# Patient Record
Sex: FEMALE | Race: WHITE | Employment: UNEMPLOYED | URBAN - METROPOLITAN AREA
[De-identification: names, ages, dates, MRNs, and addresses within clinical notes are randomized per-mention and may not be internally consistent; named-entity substitution may affect disease eponyms.]

---

## 2017-02-24 ENCOUNTER — DOCUMENTATION ONLY (OUTPATIENT)
Dept: PAIN MANAGEMENT | Age: 42
End: 2017-02-24

## 2017-02-24 ENCOUNTER — OFFICE VISIT (OUTPATIENT)
Dept: PAIN MANAGEMENT | Age: 42
End: 2017-02-24

## 2017-02-24 VITALS
WEIGHT: 175 LBS | HEIGHT: 67 IN | BODY MASS INDEX: 27.47 KG/M2 | HEART RATE: 70 BPM | SYSTOLIC BLOOD PRESSURE: 160 MMHG | DIASTOLIC BLOOD PRESSURE: 99 MMHG

## 2017-02-24 DIAGNOSIS — K66.0 ADHESION OF ABDOMINAL WALL: ICD-10-CM

## 2017-02-24 DIAGNOSIS — M54.12 BRACHIAL NEURITIS OR RADICULITIS: ICD-10-CM

## 2017-02-24 DIAGNOSIS — M53.1 OTHER SYNDROMES AFFECTING CERVICAL REGION: ICD-10-CM

## 2017-02-24 DIAGNOSIS — M47.812 FACET ARTHROPATHY, CERVICAL: ICD-10-CM

## 2017-02-24 DIAGNOSIS — G62.9 NEUROPATHY: ICD-10-CM

## 2017-02-24 DIAGNOSIS — G25.81 RESTLESS LEGS SYNDROME (RLS): ICD-10-CM

## 2017-02-24 DIAGNOSIS — M25.572 CHRONIC PAIN OF LEFT ANKLE: Primary | ICD-10-CM

## 2017-02-24 DIAGNOSIS — M54.17 LUMBOSACRAL RADICULOPATHY: ICD-10-CM

## 2017-02-24 DIAGNOSIS — G89.29 CHRONIC PAIN OF LEFT ANKLE: Primary | ICD-10-CM

## 2017-02-24 DIAGNOSIS — G89.29 CHRONIC BILATERAL LOW BACK PAIN WITH BILATERAL SCIATICA: ICD-10-CM

## 2017-02-24 DIAGNOSIS — M79.18 MUSCULOSKELETAL PAIN: ICD-10-CM

## 2017-02-24 DIAGNOSIS — M54.41 CHRONIC BILATERAL LOW BACK PAIN WITH BILATERAL SCIATICA: ICD-10-CM

## 2017-02-24 DIAGNOSIS — M79.7 FIBROMYALGIA: ICD-10-CM

## 2017-02-24 DIAGNOSIS — Z79.899 ENCOUNTER FOR LONG-TERM (CURRENT) USE OF HIGH-RISK MEDICATION: ICD-10-CM

## 2017-02-24 DIAGNOSIS — G43.119 INTRACTABLE MIGRAINE WITH AURA WITHOUT STATUS MIGRAINOSUS: ICD-10-CM

## 2017-02-24 DIAGNOSIS — M54.42 CHRONIC BILATERAL LOW BACK PAIN WITH BILATERAL SCIATICA: ICD-10-CM

## 2017-02-24 DIAGNOSIS — G89.4 CHRONIC PAIN SYNDROME: ICD-10-CM

## 2017-02-24 DIAGNOSIS — M50.30 DDD (DEGENERATIVE DISC DISEASE), CERVICAL: ICD-10-CM

## 2017-02-24 RX ORDER — ONDANSETRON 8 MG/1
TABLET, ORALLY DISINTEGRATING ORAL
Qty: 60 TAB | Refills: 5 | Status: SHIPPED | OUTPATIENT
Start: 2017-02-24 | End: 2018-02-13 | Stop reason: SDUPTHER

## 2017-02-24 RX ORDER — OXYCODONE AND ACETAMINOPHEN 10; 325 MG/1; MG/1
1 TABLET ORAL
Qty: 180 TAB | Refills: 0 | Status: SHIPPED | OUTPATIENT
Start: 2017-03-25 | End: 2017-04-24

## 2017-02-24 RX ORDER — TRAZODONE HYDROCHLORIDE 150 MG/1
75-150 TABLET ORAL
Qty: 30 TAB | Refills: 11 | Status: SHIPPED | OUTPATIENT
Start: 2017-02-24 | End: 2017-03-26

## 2017-02-24 RX ORDER — OXYCODONE AND ACETAMINOPHEN 10; 325 MG/1; MG/1
1 TABLET ORAL
Qty: 180 TAB | Refills: 0 | Status: SHIPPED | OUTPATIENT
Start: 2017-02-24 | End: 2017-03-26

## 2017-02-24 RX ORDER — OXYCODONE AND ACETAMINOPHEN 10; 325 MG/1; MG/1
1 TABLET ORAL
Qty: 180 TAB | Refills: 0 | Status: SHIPPED | OUTPATIENT
Start: 2017-04-23 | End: 2017-05-22 | Stop reason: SDUPTHER

## 2017-02-24 NOTE — PROGRESS NOTES
Paperwork for back brace faxed to [facility]. They will schedule directly with pt.

## 2017-02-24 NOTE — PROGRESS NOTES
HISTORY OF PRESENT ILLNESS  Jameel Issa is a 36 y.o. female. HPI she returns for follow-up of chronic, severe pain which is multifocal and multifactorial and includes chronic intractable migraine headaches, low back pain with underlying degenerative disc disease and radiculopathy, cervicogenic headaches and cervical radiculopathy, restless leg syndrome, and fibromyalgia. She has also been plagued by increasingly severe left ankle and foot pain with instability of the ankle to the point where she has experienced multiple episodes where her ankle rolls out from under her. She is also experiencing significant numbness in the left ankle. There is no history of trauma and orthopedic consultation will be arranged. Since last seen, she was seen by her PCP as result of increasingly severe paracervical muscle spasm and received an injection of Toradol. Her most recent MRI of the cervical spine was reviewed with results as follows: FINDINGS: CEREBELLAR TONSILS NORMAL. NO DEMYELINATING PROCESS. NO  PREVERTEBRAL SOFT TISSUE SWELLING. AT C2-3 MILD DISC BULGE. NO STENOSIS. AT C3-4 SHALLOW POSTERIOR DISC BULGE CAUSING MILD RETRO THECAL SAC  INDENTATION. NO STENOSIS. AT C4-5 NORMAL DISC. NO STENOSIS. AT C5-C6 DISC BULGE WITH RIGHT FORAMINAL DISC OSTEOPHYTE COMPLEX 3 MM AP  CAUSING MILD TO MODERATE RIGHT NEURAL FORAMINAL STENOSIS. DISC BULGES  WITH MILD VENTRAL THECAL SAC INDENTATION. MILD CENTRAL AND MILD LEFT  NEURAL FORAMINAL STENOSIS. AT C5-6 THROUGH T1-T2 NORMAL DISC. NO STENOSIS. The significance of the MRI findings was discussed with patient. She has also undergone an MRI of the brain which was reviewed with results as follows:       FINDINGS:     The left vertebral artery is dominant. There is no focal hemodynamically  significant stenosis within the anterior or posterior circulation. The anterior  and posterior communicating arteries appear patent.  There is a 3.2 mm aneurysm  projecting medially from the supraclinoid portion of the left internal carotid  artery visualized best on image 1 of series 301 and image 71 of series 3. Additional 1 mm aneurysm versus vessel tortuosity at the junction of the left A1  and M1 segments. She is slated to be seen in neuroradiologic consultation and undergo an ablative procedure of the aneurysm. Pain level today 6 out of 10, outcome 13/28,(The lower the upper number, the better the outcome) nothing dramatic  EFPhysical activity and mobility as well as mood are fair, sleep is poor. No reported side effects. Review of Systems   Constitutional: Positive for malaise/fatigue. Negative for chills, fever and weight loss. HENT: Positive for congestion and sore throat. Negative for hearing loss. Eyes: Negative for blurred vision and double vision. Respiratory: Positive for cough and shortness of breath. Smoker (1.5 ppd)   Cardiovascular: Negative for chest pain and leg swelling. Gastrointestinal: Positive for heartburn. Negative for constipation, diarrhea, nausea and vomiting. Genitourinary: Positive for frequency. Musculoskeletal: Positive for back pain, joint pain, myalgias and neck pain. Negative for falls. Skin: Negative. Neurological: Positive for tingling, sensory change and headaches. Negative for dizziness, tremors, seizures and weakness. Endo/Heme/Allergies: Positive for environmental allergies. Does not bruise/bleed easily. Psychiatric/Behavioral: Negative for depression and suicidal ideas. The patient is nervous/anxious and has insomnia. All other systems reviewed and are negative. Physical Exam   Constitutional: She is oriented to person, place, and time. She appears well-developed and well-nourished. No distress. HENT:   Head: Normocephalic. Eyes: Conjunctivae and EOM are normal. Pupils are equal, round, and reactive to light. Neck: Normal range of motion. No thyromegaly present.    Painful ROM Pulmonary/Chest: Effort normal. No respiratory distress. Musculoskeletal: She exhibits tenderness. She exhibits no edema. Left wrist: She exhibits tenderness. Cervical back: She exhibits decreased range of motion, tenderness, pain and spasm. Lumbar back: She exhibits decreased range of motion, tenderness, pain and spasm. Neurological: She is alert and oriented to person, place, and time. No cranial nerve deficit (grossly). Gait (antalgic) abnormal.   CN 2-12 grossly intact   Psychiatric: She has a normal mood and affect. Her behavior is normal. Judgment and thought content normal.   Nursing note and vitals reviewed. ASSESSMENT and PLAN  Encounter Diagnoses   Name Primary?  Chronic pain of left ankle Yes    Intractable migraine with aura without status migrainosus     Brachial neuritis or radiculitis     Neuropathy     Lumbosacral radiculopathy     Fibromyalgia     Musculoskeletal pain     Restless legs syndrome (RLS)     Adhesion of abdominal wall     DDD (degenerative disc disease), cervical     Facet arthropathy, cervical (HCC)     Chronic pain syndrome     Other syndromes affecting cervical region     Chronic bilateral low back pain with bilateral sciatica     Encounter for long-term (current) use of high-risk medication      Orthopedic consultation will be arranged with respect to her recurrent left ankle derangement. A DDS lumbar traction belt will be obtained. Allergies had been ordered in the past but was never provided to the patient. She will continue on her current analgesic regimen as this is providing adequate pain control with improve functionality and minimal side effects. 3 month reassess her    No concerns are raised for misuse, abuse, or diversion. 1. Pain medications are prescribed with the objective of pain relief and improved physical and psychosocial function in this patient.   2. Counseled patient on proper use of prescribed medications and reviewed opioid contract. 3. Counseled patient about chronic medical conditions and their relationship to anxiety and depression and recommended mental health support as needed. 4. Reviewed with patient self-help tools, home exercise, and lifestyle changes to assist the patient in self-management of symptoms. 5. Advised patient to have a primary care provider to continue care for health maintenance and general medical conditions and support for referral to specialty care as needed. 6. Reviewed with patient the treatment plan, goals of treatment plan, and limitations of treatment plan, to include the potential for side effects from medications and procedures. If side effects occur, it is the responsibility of the patient to inform the clinic so that a change in the treatment plan can be made in a safe manner. The patient is advised that stopping prescribed medication may cause an increase in symptoms and possible medication withdrawal symptoms. The patient is informed an emergency room evaluation may be necessary if this occurs. DISPOSITION: The patients condition and plan were discussed at length and all questions were answered. The patient agrees with the plan.     Counseling occupied > 50% of visit:  Total time: 40 minutes

## 2017-02-24 NOTE — PROGRESS NOTES
Nursing Notes    Patient presents to the office today in follow-up. Patient rates her pain at 6/10 on the numerical pain scale. Reviewed medications with counts as follows:    Rx Date filled Qty Dispensed Pill Count Last Dose Short   Percocet 10/325 mg 01/25/17 180 5 today no                                  POC UDS was not performed in office today    Any new labs or imaging since last appointment? NO    Have you been to an emergency room (ER) or urgent care clinic since your last visit? NO            Have you been hospitalized since your last visit? NO     If yes, where, when, and reason for visit? Have you seen or consulted any other health care providers outside of the [address] [place] [address]  since your last visit? YES     If yes, where, when, and reason for visit? pcp    HM deferred to pcp.

## 2017-05-22 RX ORDER — OXYCODONE AND ACETAMINOPHEN 10; 325 MG/1; MG/1
1 TABLET ORAL
Qty: 180 TAB | Refills: 0 | Status: SHIPPED | OUTPATIENT
Start: 2017-05-22 | End: 2017-05-31 | Stop reason: SDUPTHER

## 2017-05-22 RX ORDER — GABAPENTIN 300 MG/1
300 CAPSULE ORAL 3 TIMES DAILY
Qty: 90 CAP | Refills: 5 | Status: SHIPPED | OUTPATIENT
Start: 2017-05-22 | End: 2017-05-31 | Stop reason: SDUPTHER

## 2017-05-22 NOTE — TELEPHONE ENCOUNTER
The pt was called and made aware that the provider will not be in the office for her appt. She was offered another appt and will need medications to make it to that appt. A  was obtained and there were no aberrancies noted. She last filled her percocet on 04/23/17. She will be due for a UDS specimen at her prescription .

## 2017-05-31 ENCOUNTER — OFFICE VISIT (OUTPATIENT)
Dept: PAIN MANAGEMENT | Age: 42
End: 2017-05-31

## 2017-05-31 VITALS — SYSTOLIC BLOOD PRESSURE: 155 MMHG | HEART RATE: 79 BPM | DIASTOLIC BLOOD PRESSURE: 93 MMHG

## 2017-05-31 DIAGNOSIS — M54.17 LUMBOSACRAL RADICULOPATHY: ICD-10-CM

## 2017-05-31 DIAGNOSIS — M79.18 MUSCULOSKELETAL PAIN: ICD-10-CM

## 2017-05-31 DIAGNOSIS — G89.4 CHRONIC PAIN SYNDROME: Primary | ICD-10-CM

## 2017-05-31 DIAGNOSIS — G24.3 CERVICAL DYSTONIA: ICD-10-CM

## 2017-05-31 DIAGNOSIS — M47.812 FACET ARTHROPATHY, CERVICAL: ICD-10-CM

## 2017-05-31 DIAGNOSIS — M79.7 FIBROMYALGIA: ICD-10-CM

## 2017-05-31 DIAGNOSIS — G43.119 INTRACTABLE MIGRAINE WITH AURA WITHOUT STATUS MIGRAINOSUS: ICD-10-CM

## 2017-05-31 DIAGNOSIS — Z79.899 ENCOUNTER FOR LONG-TERM (CURRENT) USE OF MEDICATIONS: ICD-10-CM

## 2017-05-31 DIAGNOSIS — M50.30 DEGENERATION OF CERVICAL INTERVERTEBRAL DISC: ICD-10-CM

## 2017-05-31 DIAGNOSIS — M25.78 CERVICAL OSTEOPHYTE: ICD-10-CM

## 2017-05-31 LAB
ALCOHOL UR POC: NORMAL
AMPHETAMINES UR POC: NEGATIVE
BARBITURATES UR POC: NEGATIVE
BENZODIAZEPINES UR POC: NEGATIVE
BUPRENORPHINE UR POC: NORMAL
CANNABINOIDS UR POC: NEGATIVE
CARISOPRODOL UR POC: NORMAL
COCAINE UR POC: NEGATIVE
FENTANYL UR POC: NORMAL
MDMA/ECSTASY UR POC: NEGATIVE
METHADONE UR POC: NEGATIVE
METHAMPHETAMINE UR POC: NEGATIVE
METHYLPHENIDATE UR POC: NEGATIVE
OPIATES UR POC: NEGATIVE
OXYCODONE UR POC: NORMAL
PHENCYCLIDINE UR POC: NEGATIVE
PROPOXYPHENE UR POC: NORMAL
TRAMADOL UR POC: NORMAL
TRICYCLICS UR POC: NEGATIVE

## 2017-05-31 RX ORDER — GABAPENTIN 300 MG/1
300 CAPSULE ORAL 3 TIMES DAILY
Qty: 90 CAP | Refills: 3 | Status: SHIPPED | OUTPATIENT
Start: 2017-05-31 | End: 2018-02-13 | Stop reason: SDUPTHER

## 2017-05-31 RX ORDER — OXYCODONE AND ACETAMINOPHEN 10; 325 MG/1; MG/1
1 TABLET ORAL
Qty: 180 TAB | Refills: 0 | Status: SHIPPED | OUTPATIENT
Start: 2017-06-29 | End: 2017-05-31 | Stop reason: SDUPTHER

## 2017-05-31 RX ORDER — OXYCODONE AND ACETAMINOPHEN 10; 325 MG/1; MG/1
1 TABLET ORAL
Qty: 180 TAB | Refills: 0 | Status: SHIPPED | OUTPATIENT
Start: 2017-07-28 | End: 2017-09-11 | Stop reason: SDUPTHER

## 2017-05-31 RX ORDER — OXYCODONE AND ACETAMINOPHEN 10; 325 MG/1; MG/1
1 TABLET ORAL
Qty: 180 TAB | Refills: 0 | Status: SHIPPED | OUTPATIENT
Start: 2017-05-31 | End: 2017-05-31

## 2017-05-31 NOTE — MR AVS SNAPSHOT
Visit Information Date & Time Provider Department Dept. Phone Encounter #  
 5/31/2017  3:15 PM Caleb Nance MD [facility] [address] for Pain Management [phone] Follow-up Instructions Return in about 3 months (around 8/31/2017). Upcoming Health Maintenance Date Due Pneumococcal 19-64 Medium Risk (1 of 1 - PPSV23) 1/15/1993 DTaP/Tdap/Td series (1 - Tdap) 1/15/1995 PAP AKA CERVICAL CYTOLOGY 1/15/1995 INFLUENZA AGE 9 TO ADULT 8/1/2017 Allergies as of 5/31/2017  Review Complete On: 5/31/2017 By: Yolande Peck LPN Severity Noted Reaction Type Reactions Bactrim [Sulfamethoprim Ds]    Unknown (comments) Cefuroxime    Unknown (comments) Codeine    Unknown (comments) Compazine [Prochlorperazine Edisylate]    Unknown (comments) Glycopyrrolate    Unknown (comments) Keflex [Cephalexin]    Unknown (comments) Levaquin [Levofloxacin]    Unknown (comments) Morphine    Unknown (comments) Phenergan [Promethazine]    Unknown (comments) Reglan [Metoclopramide]    Unknown (comments) Vicodin [Hydrocodone-acetaminophen]    Unknown (comments) Current Immunizations  Never Reviewed No immunizations on file. Not reviewed this visit You Were Diagnosed With   
  
 Codes Comments Encounter for long-term (current) use of medications    -  Primary ICD-10-CM: U01.354 ICD-9-CM: V58.69 Vitals BP Pulse LMP OB Status Smoking Status (!) 155/93 79 05/14/2017 Having regular periods Current Every Day Smoker Vitals History Preferred Pharmacy Pharmacy Name Phone [address], [address] [place] [address] [phone] Your Updated Medication List  
  
   
This list is accurate as of: 5/31/17  3:52 PM.  Always use your most recent med list.  
  
  
  
  
 ATENOLOL PO Take  by mouth. frovatriptan 2.5 mg tablet Commonly known as:  Hafsa Hicks Take 1 Tab by mouth once as needed for Migraine for up to 1 dose. gabapentin 300 mg capsule Commonly known as:  NEURONTIN Take 1 Cap by mouth three (3) times daily for 30 days. JANUVIA 100 mg tablet Generic drug:  SITagliptin Take 50 mg by mouth daily. METFORMIN PO Take  by mouth.  
  
 methylPREDNISolone 4 mg tablet Commonly known as:  Thi Pacheco Per dose pack instructions MOTRIN 800 mg tablet Generic drug:  ibuprofen Take  by mouth.  
  
 naratriptan 2.5 mg Tab Commonly known as:  Ella Kays Take 1 Tab by mouth once as needed for Migraine for up to 1 dose. 2.5 mg at onset of headache, may repeat in 4 hours if needed  
  
 ondansetron 8 mg disintegrating tablet Commonly known as:  ZOFRAN ODT  
DISSOLVE 1 TABLET UNDER THE TONGUE EVERY EIGHT HOURS AS NEEDED FOR NAUSEA  
  
 * OTHER  
T-L-S-O  
  
 * OTHER  
DDS Lumbar Traction Belt * PERCOCET  mg per tablet Generic drug:  oxyCODONE-acetaminophen Take 1 Tab by mouth. * oxyCODONE-acetaminophen  mg per tablet Commonly known as:  PERCOCET 10 Take 1 Tab by mouth every four (4) hours as needed for Pain (chronic) for up to 30 days. Max Daily Amount: 6 Tabs. Due to fill 05/22/17  Indications: Pain  
  
 propranolol LA 60 mg SR capsule Commonly known as:  INDERAL LA Take  by mouth daily. PROTONIX 40 mg tablet Generic drug:  pantoprazole Take 40 mg by mouth daily. REQUIP PO Take  by mouth. TENS Units Kennis Pesa Commonly known as:  TENS 502  
1 Units by Does Not Apply route as directed. topiramate 25 mg tablet Commonly known as:  TOPAMAX This is a titration schedule. Start with 1 po qhs x 7 days. Then 1 po bid x 7 days. Then  1 po qam and 2 po qhs. Then  2 po bid. ZOFRAN (AS HYDROCHLORIDE) 8 mg tablet Generic drug:  ondansetron hcl Take 8 mg by mouth every eight (8) hours as needed for Nausea. ZyrTEC 10 mg tablet Generic drug:  cetirizine Take  by mouth daily. * Notice: This list has 4 medication(s) that are the same as other medications prescribed for you. Read the directions carefully, and ask your doctor or other care provider to review them with you. We Performed the Following AMB POC DRUG SCREEN () [ [facility]] DRUG SCREEN [[id number] Custom] Follow-up Instructions Return in about 3 months (around 8/31/2017). Introducing hospitals & [facility] SERVICES! [facility] introduces [employer] patient portal. Now you can access parts of your medical record, email your doctor's office, and request medication refills online. 1. In your internet browser, go to [email or web address] 2. Click on the First Time User? Click Here link in the Sign In box. You will see the New Member Sign Up page. 3. Enter your [employer] Access Code exactly as it appears below. You will not need to use this code after youve completed the sign-up process. If you do not sign up before the expiration date, you must request a new code. · [employer] Access Code: [email or web address] Expires: 8/29/2017  3:52 PM 
 
4. Enter the last four digits of your Social Security Number (xxxx) and Date of Birth (mm/dd/yyyy) as indicated and click Submit. You will be taken to the next sign-up page. 5. Create a [employer] ID. This will be your [employer] login ID and cannot be changed, so think of one that is secure and easy to remember. 6. Create a [employer] password. You can change your password at any time. 7. Enter your Password Reset Question and Answer. This can be used at a later time if you forget your password. 8. Enter your e-mail address. You will receive e-mail notification when new information is available in [address] 9. Click Sign Up. You can now view and download portions of your medical record. 10. Click the Download Summary menu link to download a portable copy of your medical information. If you have questions, please visit the Frequently Asked Questions section of the [employer] website. Remember, [employer] is NOT to be used for urgent needs. For medical emergencies, dial 911. Now available from your iPhone and Android! Please provide this summary of care documentation to your next provider. Your primary care clinician is listed as Patricia Briones. If you have any questions after today's visit, please call [phone].

## 2017-05-31 NOTE — PROGRESS NOTES
Nursing Notes    Patient presents to the office today in follow-up. Reviewed medications with counts as follows:    Rx Date filled Qty Dispensed Pill Count Last Dose Short   Percocet 10/325 5/23/17 180 128 today no   Ms. Stephenie Montgomery has a reminder for a \"due or due soon\" health maintenance. I have asked that she contact her primary care provider for follow-up on this health maintenance. Comments: patient came in sick today. Complaints of vomiting. Refuses to wear a face mask. Denies visiting her PCP    POC UDS was performed in office today    Any new labs or imaging since last appointment? NO    Have you been to an emergency room (ER) or urgent care clinic since your last visit? NO            Have you been hospitalized since your last visit? NO     If yes, where, when, and reason for visit? Have you seen or consulted any other health care providers outside of the [address] [place] [address]  since your last visit? NO     If yes, where, when, and reason for visit?

## 2017-08-09 ENCOUNTER — TELEPHONE (OUTPATIENT)
Dept: PAIN MANAGEMENT | Age: 42
End: 2017-08-09

## 2017-08-09 NOTE — TELEPHONE ENCOUNTER
Called insurance re pa for oxycodone/apap - spoke with Marianna Almodovar. Pa is in place - need to put in updates. Marianna Almodovar put me on hold to update system. Script will now go through - Marianna Almodovar ask me to call [employer] while she is on the phone to make sure it will go through. Spoke with pharmacist at [facility] - said pt paid out of pocket for the medication on 7/18. Bishop Messing pt pa for medication - said if pt wants to be reimbursed she has to call - no guarantee she will get her money back. Called pt to let her know problem is fixed.

## 2017-09-11 ENCOUNTER — OFFICE VISIT (OUTPATIENT)
Dept: PAIN MANAGEMENT | Age: 42
End: 2017-09-11

## 2017-09-11 VITALS
RESPIRATION RATE: 12 BRPM | DIASTOLIC BLOOD PRESSURE: 99 MMHG | TEMPERATURE: 98.1 F | WEIGHT: 179 LBS | SYSTOLIC BLOOD PRESSURE: 187 MMHG | HEIGHT: 67 IN | BODY MASS INDEX: 28.09 KG/M2 | HEART RATE: 76 BPM

## 2017-09-11 DIAGNOSIS — M79.7 FIBROMYALGIA: ICD-10-CM

## 2017-09-11 DIAGNOSIS — G89.29 CHRONIC BILATERAL LOW BACK PAIN WITH BILATERAL SCIATICA: ICD-10-CM

## 2017-09-11 DIAGNOSIS — M53.1 OTHER SYNDROMES AFFECTING CERVICAL REGION: ICD-10-CM

## 2017-09-11 DIAGNOSIS — Z86.79 HX OF ANEURYSM: ICD-10-CM

## 2017-09-11 DIAGNOSIS — M54.41 CHRONIC BILATERAL LOW BACK PAIN WITH BILATERAL SCIATICA: ICD-10-CM

## 2017-09-11 DIAGNOSIS — G25.81 RESTLESS LEGS SYNDROME (RLS): ICD-10-CM

## 2017-09-11 DIAGNOSIS — M54.42 CHRONIC BILATERAL LOW BACK PAIN WITH BILATERAL SCIATICA: ICD-10-CM

## 2017-09-11 DIAGNOSIS — G43.119 INTRACTABLE MIGRAINE WITH AURA WITHOUT STATUS MIGRAINOSUS: Primary | ICD-10-CM

## 2017-09-11 DIAGNOSIS — G62.9 NEUROPATHY: ICD-10-CM

## 2017-09-11 DIAGNOSIS — M54.17 LUMBOSACRAL RADICULOPATHY: ICD-10-CM

## 2017-09-11 DIAGNOSIS — Z79.899 ENCOUNTER FOR LONG-TERM (CURRENT) USE OF HIGH-RISK MEDICATION: ICD-10-CM

## 2017-09-11 DIAGNOSIS — G89.4 CHRONIC PAIN SYNDROME: ICD-10-CM

## 2017-09-11 DIAGNOSIS — M54.12 BRACHIAL NEURITIS OR RADICULITIS: ICD-10-CM

## 2017-09-11 DIAGNOSIS — M50.30 DEGENERATION OF CERVICAL INTERVERTEBRAL DISC: ICD-10-CM

## 2017-09-11 DIAGNOSIS — M25.50 PAIN IN JOINT, MULTIPLE SITES: ICD-10-CM

## 2017-09-11 RX ORDER — OXAPROZIN 600 MG/1
TABLET, FILM COATED ORAL
Qty: 60 TAB | Refills: 5 | Status: SHIPPED | OUTPATIENT
Start: 2017-09-11

## 2017-09-11 RX ORDER — AMLODIPINE BESYLATE 5 MG/1
5 TABLET ORAL DAILY
Qty: 30 TAB | Refills: 5 | Status: SHIPPED | OUTPATIENT
Start: 2017-09-11 | End: 2017-10-11

## 2017-09-11 RX ORDER — OXYCODONE AND ACETAMINOPHEN 10; 325 MG/1; MG/1
1 TABLET ORAL
Qty: 180 TAB | Refills: 0 | Status: SHIPPED | OUTPATIENT
Start: 2017-11-11 | End: 2017-11-17 | Stop reason: SDUPTHER

## 2017-09-11 RX ORDER — OXYCODONE AND ACETAMINOPHEN 10; 325 MG/1; MG/1
1 TABLET ORAL
Qty: 180 TAB | Refills: 0 | Status: SHIPPED | OUTPATIENT
Start: 2017-09-15 | End: 2017-11-17 | Stop reason: SDUPTHER

## 2017-09-11 RX ORDER — OXYCODONE AND ACETAMINOPHEN 10; 325 MG/1; MG/1
1 TABLET ORAL
Qty: 180 TAB | Refills: 0 | Status: SHIPPED | OUTPATIENT
Start: 2017-10-13 | End: 2017-11-17 | Stop reason: SDUPTHER

## 2017-09-11 NOTE — PROGRESS NOTES
HISTORY OF PRESENT ILLNESS  Bertha Emanuel is a 36 y.o. female. HPI she returns for follow-up of chronic, severe pain which is multifocal and multifactorial and includes chronic intractable migraine headaches, low back pain with underlying degenerative disc disease and radiculopathy, cervicogenic headaches and cervical radiculopathy, restless leg syndrome, and fibromyalgia. Since last seen, she underwent duplex evaluation of the lower extremity venous circulation which was noted to be unremarkable. She is also complaining of increasing severe headaches. She has been reevaluated for aneurysm surgery and this is pending resolution of her intercurrent dental problems. Blood pressure noted be elevated today and Norvasc will be added at 5 mg daily both to help control her blood pressure as well as to aid as a preventative in her chronic vascular headaches. She has also been noting increasing low back pain. She has undergone epidural injections in the past with relief but wishes to hold off interventions at present time. A trial of Daypro will be added to her Percocet in attempt to gain better pain control, particularly if her acute headaches. Pain level today 6 out of 10, outcome 13/28,(The lower the upper number, the better the outcome)  Physical activity and mobility as well as mood of fair, sleep is poor. No reported side effects. A current review of the  does not identify any inconsistency. Review of Systems   Constitutional: Positive for malaise/fatigue. Negative for chills, fever and weight loss. HENT: Positive for congestion and sore throat. Negative for hearing loss. Eyes: Negative for blurred vision and double vision. Respiratory: Positive for cough and shortness of breath. Smoker (1.5 ppd)   Cardiovascular: Negative for chest pain and leg swelling. Gastrointestinal: Positive for heartburn. Negative for constipation, diarrhea, nausea and vomiting.    Genitourinary: Positive for frequency. Musculoskeletal: Positive for back pain, joint pain, myalgias and neck pain. Negative for falls. Skin: Negative. Neurological: Positive for tingling, sensory change and headaches. Negative for dizziness, tremors, seizures and weakness. Endo/Heme/Allergies: Positive for environmental allergies. Does not bruise/bleed easily. Psychiatric/Behavioral: Negative for depression and suicidal ideas. The patient is nervous/anxious and has insomnia. All other systems reviewed and are negative. Physical Exam   Constitutional: She is oriented to person, place, and time. She appears well-developed and well-nourished. No distress. HENT:   Head: Normocephalic. Eyes: Conjunctivae and EOM are normal. Pupils are equal, round, and reactive to light. Neck: Normal range of motion. No thyromegaly present. Painful ROM   Pulmonary/Chest: Effort normal. No respiratory distress. Musculoskeletal: She exhibits tenderness. She exhibits no edema. Left wrist: She exhibits tenderness. Cervical back: She exhibits decreased range of motion, tenderness, pain and spasm. Lumbar back: She exhibits decreased range of motion, tenderness, pain and spasm. Neurological: She is alert and oriented to person, place, and time. No cranial nerve deficit (grossly). Gait (antalgic) abnormal.   CN 2-12 grossly intact   Psychiatric: She has a normal mood and affect. Her behavior is normal. Judgment and thought content normal.   Nursing note and vitals reviewed. ASSESSMENT and PLAN  Encounter Diagnoses   Name Primary?     Intractable migraine with aura without status migrainosus Yes    Hx of aneurysm     Lumbosacral radiculopathy     Neuropathy (HCC)     Brachial neuritis or radiculitis     Chronic bilateral low back pain with bilateral sciatica     Pain in joint, multiple sites     Degeneration of cervical intervertebral disc     Encounter for long-term (current) use of high-risk medication     Other syndromes affecting cervical region     Chronic pain syndrome     Restless legs syndrome (RLS)     Fibromyalgia      Treatment plan as noted above. She will continue on her current analgesic regimen as this is providing adequate pain control with improved functionality and minimal side effects. 3 month reassess her    No concerns are raised for misuse, abuse, or diversion. 1. Pain medications are prescribed with the objective of pain relief and improved physical and psychosocial function in this patient. 2. Counseled patient on proper use of prescribed medications and reviewed opioid contract. 3. Counseled patient about chronic medical conditions and their relationship to anxiety and depression and recommended mental health support as needed. 4. Reviewed with patient self-help tools, home exercise, and lifestyle changes to assist the patient in self-management of symptoms. 5. Advised patient to have a primary care provider to continue care for health maintenance and general medical conditions and support for referral to specialty care as needed. 6. Reviewed with patient the treatment plan, goals of treatment plan, and limitations of treatment plan, to include the potential for side effects from medications and procedures. If side effects occur, it is the responsibility of the patient to inform the clinic so that a change in the treatment plan can be made in a safe manner. The patient is advised that stopping prescribed medication may cause an increase in symptoms and possible medication withdrawal symptoms. The patient is informed an emergency room evaluation may be necessary if this occurs. DISPOSITION: The patients condition and plan were discussed at length and all questions were answered. The patient agrees with the plan.     Counseling occupied > 50% of visit:  Total time: 40minutes

## 2017-09-11 NOTE — PROGRESS NOTES
Nursing Notes    Patient presents to the office today in follow-up. Patient rates her pain at 6/10 on the numerical pain scale. Reviewed medications with counts as follows:    Rx Date filled Qty Dispensed Pill Count Last Dose Short   Percocet 10-325mg 08/17/17 180 28 today                                           Comments:     POC UDS was not performed in office today    Any new labs or imaging since last appointment? NO    Have you been to an emergency room (ER) or urgent care clinic since your last visit? YES, in [place] for flu            Have you been hospitalized since your last visit? NO     If yes, where, when, and reason for visit? Have you seen or consulted any other health care providers outside of the [address] [place] [address]  since your last visit? YES     If yes, where, when, and reason for visit? HM deferred to pcp.

## 2017-09-11 NOTE — MR AVS SNAPSHOT
Visit Information Date & Time Provider Department Dept. Phone Encounter #  
 9/11/2017 10:40 AM Aubrey Rodas MD [address] [facility] [phone] Follow-up Instructions Return in about 3 months (around 12/11/2017). Upcoming Health Maintenance Date Due Pneumococcal 19-64 Medium Risk (1 of 1 - PPSV23) 1/15/1993 DTaP/Tdap/Td series (1 - Tdap) 1/15/1995 PAP AKA CERVICAL CYTOLOGY 1/15/1995 INFLUENZA AGE 9 TO ADULT 8/1/2017 Allergies as of 9/11/2017  Review Complete On: 9/11/2017 By: Aubrey Rodas MD  
  
 Severity Noted Reaction Type Reactions Bactrim [Sulfamethoprim Ds]    Unknown (comments) Cefuroxime    Unknown (comments) Codeine    Unknown (comments) Compazine [Prochlorperazine Edisylate]    Unknown (comments) Glycopyrrolate    Unknown (comments) Keflex [Cephalexin]    Unknown (comments) Levaquin [Levofloxacin]    Unknown (comments) Morphine    Unknown (comments) Phenergan [Promethazine]    Unknown (comments) Reglan [Metoclopramide]    Unknown (comments) Vicodin [Hydrocodone-acetaminophen]    Unknown (comments) Current Immunizations  Never Reviewed No immunizations on file. Not reviewed this visit You Were Diagnosed With   
  
 Codes Comments Intractable migraine with aura without status migrainosus    -  Primary ICD-10-CM: X60.931 ICD-9-CM: 346.01 Hx of aneurysm     ICD-10-CM: Z86.79 
ICD-9-CM: V12.59 Lumbosacral radiculopathy     ICD-10-CM: M54.17 ICD-9-CM: 724.4 Neuropathy ([facility]     ICD-10-CM: G62.9 ICD-9-CM: 355.9 Brachial neuritis or radiculitis     ICD-10-CM: M54.12 
ICD-9-CM: 723.4 Chronic bilateral low back pain with bilateral sciatica     ICD-10-CM: M54.42, M54.41, G89.29 ICD-9-CM: 724.2, 724.3, 338.29 Pain in joint, multiple sites     ICD-10-CM: M25.50 ICD-9-CM: 719.49   
 Degeneration of cervical intervertebral disc     ICD-10-CM: M50.30 ICD-9-CM: 722.4 Encounter for long-term (current) use of high-risk medication     ICD-10-CM: Z79.899 ICD-9-CM: V58.69 Other syndromes affecting cervical region     ICD-10-CM: M53.1 ICD-9-CM: 723.8 Chronic pain syndrome     ICD-10-CM: G89.4 ICD-9-CM: 338.4 Restless legs syndrome (RLS)     ICD-10-CM: G25.81 ICD-9-CM: 333.94 Fibromyalgia     ICD-10-CM: M79.7 ICD-9-CM: 729.1 Vitals BP Pulse Temp Resp Height(growth percentile) Weight(growth percentile) (!) 187/99 76 98.1 °F (36.7 °C) 12 5' 7\" (1.702 m) 179 lb (81.2 kg) LMP BMI OB Status Smoking Status 08/30/2017 28.04 kg/m2 Having regular periods Current Every Day Smoker BMI and BSA Data Body Mass Index Body Surface Area 28.04 kg/m 2 1.96 m 2 Preferred Pharmacy Pharmacy Name Phone [address], [address] [place] [address] [phone] Your Updated Medication List  
  
   
This list is accurate as of: 9/11/17 11:53 AM.  Always use your most recent med list. amLODIPine 5 mg tablet Commonly known as:  Powell Railing Take 1 Tab by mouth daily for 30 days. ATENOLOL PO Take  by mouth. frovatriptan 2.5 mg tablet Commonly known as:  Royetta Duck Take 1 Tab by mouth once as needed for Migraine for up to 1 dose. JANUVIA 100 mg tablet Generic drug:  SITagliptin Take 50 mg by mouth daily. METFORMIN PO Take  by mouth.  
  
 methylPREDNISolone 4 mg tablet Commonly known as:  Vernette Reji Per dose pack instructions MOTRIN 800 mg tablet Generic drug:  ibuprofen Take  by mouth.  
  
 naratriptan 2.5 mg Tab Commonly known as:  Lorenza Chester Take 1 Tab by mouth once as needed for Migraine for up to 1 dose. 2.5 mg at onset of headache, may repeat in 4 hours if needed  
  
 ondansetron 8 mg disintegrating tablet Commonly known as:  ZOFRAN ODT  
 DISSOLVE 1 TABLET UNDER THE TONGUE EVERY EIGHT HOURS AS NEEDED FOR NAUSEA  
  
 * OTHER  
T-L-S-O  
  
 * OTHER  
DDS Lumbar Traction Belt  
  
 oxaprozin 600 mg tablet Commonly known as:  DAYPRO  
1-2 po prn severe headache  Max 2/day * PERCOCET  mg per tablet Generic drug:  oxyCODONE-acetaminophen Take 1 Tab by mouth. * oxyCODONE-acetaminophen  mg per tablet Commonly known as:  PERCOCET 10 Take 1 Tab by mouth every four (4) hours as needed for Pain (chronic) for up to 30 days. Max Daily Amount: 6 Tabs. For chronic pain  Indications: Pain Start taking on:  9/15/2017 * oxyCODONE-acetaminophen  mg per tablet Commonly known as:  PERCOCET 10 Take 1 Tab by mouth every four (4) hours as needed for Pain (chronic) for up to 30 days. Max Daily Amount: 6 Tabs. For chronic pain  Indications: Pain Start taking on:  10/13/2017 * oxyCODONE-acetaminophen  mg per tablet Commonly known as:  PERCOCET 10 Take 1 Tab by mouth every four (4) hours as needed for Pain (chronic) for up to 30 days. Max Daily Amount: 6 Tabs. For chronic pain  Indications: Pain Start taking on:  11/11/2017  
  
 propranolol LA 60 mg SR capsule Commonly known as:  INDERAL LA Take  by mouth daily. PROTONIX 40 mg tablet Generic drug:  pantoprazole Take 40 mg by mouth daily. REQUIP PO Take  by mouth. TENS Units Nyla Sour Commonly known as:  TENS 502  
1 Units by Does Not Apply route as directed. topiramate 25 mg tablet Commonly known as:  TOPAMAX This is a titration schedule. Start with 1 po qhs x 7 days. Then 1 po bid x 7 days. Then  1 po qam and 2 po qhs. Then  2 po bid. ZOFRAN (AS HYDROCHLORIDE) 8 mg tablet Generic drug:  ondansetron hcl Take 8 mg by mouth every eight (8) hours as needed for Nausea. ZyrTEC 10 mg tablet Generic drug:  cetirizine Take  by mouth daily. * Notice: This list has 6 medication(s) that are the same as other medications prescribed for you. Read the directions carefully, and ask your doctor or other care provider to review them with you. Prescriptions Printed Refills  
 oxyCODONE-acetaminophen (PERCOCET 10)  mg per tablet 0 Starting on: 2017 Sig: Take 1 Tab by mouth every four (4) hours as needed for Pain (chronic) for up to 30 days. Max Daily Amount: 6 Tabs. For chronic pain  Indications: Pain Class: Print Route: Oral  
 oxyCODONE-acetaminophen (PERCOCET 10)  mg per tablet 0 Starting on: 10/13/2017 Sig: Take 1 Tab by mouth every four (4) hours as needed for Pain (chronic) for up to 30 days. Max Daily Amount: 6 Tabs. For chronic pain  Indications: Pain Class: Print Route: Oral  
 oxyCODONE-acetaminophen (PERCOCET 10)  mg per tablet 0 Starting on: 9/15/2017 Sig: Take 1 Tab by mouth every four (4) hours as needed for Pain (chronic) for up to 30 days. Max Daily Amount: 6 Tabs. For chronic pain  Indications: Pain Class: Print Route: Oral  
  
Prescriptions Sent to Pharmacy Refills  
 amLODIPine (NORVASC) 5 mg tablet 5 Sig: Take 1 Tab by mouth daily for 30 days. Class: Normal  
 Pharmacy: [address] [place] [address] Ph #: [phone] Route: Oral  
 oxaprozin (DAYPRO) 600 mg tablet 5 Si-2 po prn severe headache  Max 2/day Class: Normal  
 Pharmacy: [address] [place] [address] Ph #: [phone] Follow-up Instructions Return in about 3 months (around 2017). Patient Instructions Current health maintenance issues were reviewed and the patient was advised to followup with his/her PCP for completion of these items. Introducing [facility]!    
 Chip Huertas introduces [employer] patient portal. Now you can access parts of your medical record, email your doctor's office, and request medication refills online. 1. In your internet browser, go to [email or web address] 2. Click on the First Time User? Click Here link in the Sign In box. You will see the New Member Sign Up page. 3. Enter your MySongToYou Access Code exactly as it appears below. You will not need to use this code after youve completed the sign-up process. If you do not sign up before the expiration date, you must request a new code. · MySongToYou Access Code: [email or web address] Expires: 12/10/2017 11:53 AM 
 
4. Enter the last four digits of your Social Security Number (xxxx) and Date of Birth (mm/dd/yyyy) as indicated and click Submit. You will be taken to the next sign-up page. 5. Create a MySongToYou ID. This will be your MySongToYou login ID and cannot be changed, so think of one that is secure and easy to remember. 6. Create a MySongToYou password. You can change your password at any time. 7. Enter your Password Reset Question and Answer. This can be used at a later time if you forget your password. 8. Enter your e-mail address. You will receive e-mail notification when new information is available in [address] 9. Click Sign Up. You can now view and download portions of your medical record. 10. Click the Download Summary menu link to download a portable copy of your medical information. If you have questions, please visit the Frequently Asked Questions section of the MySongToYou website. Remember, MySongToYou is NOT to be used for urgent needs. For medical emergencies, dial 911. Now available from your iPhone and Android! Please provide this summary of care documentation to your next provider. Your primary care clinician is listed as Beau Ruff. If you have any questions after today's visit, please call [phone].

## 2017-11-14 ENCOUNTER — TELEPHONE (OUTPATIENT)
Dept: PAIN MANAGEMENT | Age: 42
End: 2017-11-14

## 2017-11-14 NOTE — TELEPHONE ENCOUNTER
Pt has script for oxycodone/apap which needs pa - explained I cannot do the pa as [facility] is no longer with the practice. Laly Grace has agreed to see the pt on Friday to issue new scripts. Explained to pt once she see Ray I can do the pa. Pt said she no longer has [facility] - now has traditional medicaid. Told pt I will do the pa once new scripts are issued. Pt asked about pa for 's meds also as he also had an insurance change. She gave me info for his meds - was seen by Dr Pacheco Weaver. Told her I can do his pa. Pt is scheduled for 11/1717 at 1220.

## 2017-11-17 ENCOUNTER — OFFICE VISIT (OUTPATIENT)
Dept: PAIN MANAGEMENT | Age: 42
End: 2017-11-17

## 2017-11-17 VITALS
SYSTOLIC BLOOD PRESSURE: 165 MMHG | BODY MASS INDEX: 28.04 KG/M2 | HEART RATE: 72 BPM | TEMPERATURE: 97.2 F | DIASTOLIC BLOOD PRESSURE: 104 MMHG | WEIGHT: 179 LBS | RESPIRATION RATE: 18 BRPM

## 2017-11-17 DIAGNOSIS — M54.41 CHRONIC BILATERAL LOW BACK PAIN WITH BILATERAL SCIATICA: ICD-10-CM

## 2017-11-17 DIAGNOSIS — G89.4 CHRONIC PAIN SYNDROME: ICD-10-CM

## 2017-11-17 DIAGNOSIS — M25.50 PAIN IN JOINT, MULTIPLE SITES: ICD-10-CM

## 2017-11-17 DIAGNOSIS — M54.17 LUMBOSACRAL RADICULOPATHY: ICD-10-CM

## 2017-11-17 DIAGNOSIS — G24.3 CERVICAL DYSTONIA: ICD-10-CM

## 2017-11-17 DIAGNOSIS — G89.29 CHRONIC BILATERAL LOW BACK PAIN WITH BILATERAL SCIATICA: ICD-10-CM

## 2017-11-17 DIAGNOSIS — M50.30 DDD (DEGENERATIVE DISC DISEASE), CERVICAL: ICD-10-CM

## 2017-11-17 DIAGNOSIS — M79.7 FIBROMYALGIA: ICD-10-CM

## 2017-11-17 DIAGNOSIS — M50.30 DEGENERATION OF CERVICAL INTERVERTEBRAL DISC: ICD-10-CM

## 2017-11-17 DIAGNOSIS — G89.29 OTHER CHRONIC PAIN: ICD-10-CM

## 2017-11-17 DIAGNOSIS — M54.2 CERVICALGIA: ICD-10-CM

## 2017-11-17 DIAGNOSIS — M54.42 CHRONIC BILATERAL LOW BACK PAIN WITH BILATERAL SCIATICA: ICD-10-CM

## 2017-11-17 DIAGNOSIS — Z79.899 ENCOUNTER FOR LONG-TERM (CURRENT) USE OF HIGH-RISK MEDICATION: Primary | ICD-10-CM

## 2017-11-17 LAB
AMPHETAMINES UR POC: NEGATIVE
BARBITURATES UR POC: NEGATIVE
BENZODIAZEPINES UR POC: NEGATIVE
BUPRENORPHINE UR POC: NORMAL
CANNABINOIDS UR POC: NEGATIVE
CARISOPRODOL UR POC: NORMAL
COCAINE UR POC: NEGATIVE
FENTANYL UR POC: NORMAL
MDMA/ECSTASY UR POC: NEGATIVE
METHADONE UR POC: NEGATIVE
METHAMPHETAMINE UR POC: NEGATIVE
METHYLPHENIDATE UR POC: NEGATIVE
OPIATES UR POC: NEGATIVE
OXYCODONE UR POC: NEGATIVE
PHENCYCLIDINE UR POC: NEGATIVE
PROPOXYPHENE UR POC: NEGATIVE
TRAMADOL UR POC: NEGATIVE
TRICYCLICS UR POC: NEGATIVE

## 2017-11-17 RX ORDER — OXYCODONE AND ACETAMINOPHEN 10; 325 MG/1; MG/1
TABLET ORAL
Qty: 150 TAB | Refills: 0 | Status: SHIPPED | OUTPATIENT
Start: 2017-12-16 | End: 2018-02-13 | Stop reason: SDUPTHER

## 2017-11-17 RX ORDER — OXYCODONE AND ACETAMINOPHEN 10; 325 MG/1; MG/1
TABLET ORAL
Qty: 150 TAB | Refills: 0 | Status: SHIPPED | OUTPATIENT
Start: 2017-11-17 | End: 2018-02-13 | Stop reason: SDUPTHER

## 2017-11-17 RX ORDER — OXYCODONE AND ACETAMINOPHEN 10; 325 MG/1; MG/1
TABLET ORAL
Qty: 150 TAB | Refills: 0 | Status: SHIPPED | OUTPATIENT
Start: 2018-01-15 | End: 2018-02-13 | Stop reason: SDUPTHER

## 2017-11-17 NOTE — PATIENT INSTRUCTIONS
1. Continue current plan with no evidence of addiction or diversion. Stable on current medication without adverse events. 2. Refill and adjust oxycodone 10/325 mg. Take 1/2 to 1 tablet up to 5 times daily as needed  3. Consider adding long acting medication next visit. 4. Continue gabapentin. Consider increasing later if needed. 5. Continue Zofran. 6. Continue Daypro. Please discuss further refills with your PCP or alternative treatment  7. Please follow-up with your PCP to get your blood pressure under better control. 8. Discussed post surgical plan in detail today. Please contact our office within 72 hours if you receive outside controlled substance for postsurgical pain. 9. For constipation:  Increase fluids, Add fruit juices, Add high fiber diet, add Metamucil and/or Citrucel if not getting enough fiber in diet, and add OTC stool softener for moderate constipation. May add Miralax on an as needed basis. 10. Discussed risks of addiction, dependency, and opioid induced hyperalgesia.    11. Return to clinic in 3 months

## 2017-11-17 NOTE — PROGRESS NOTES
HISTORY OF PRESENT ILLNESS  Sania Ayers is a 36 y.o. female. HPI [address] [place] [address] for followup of her pain which is widespread and related to fibromyalgia. Ms. Marilyn Tavares continues unchanged since last visit. She has been doing well with her current treatment plan. Unfortunately her provider is no longer with our practice. Her current prescription of oxycodone is at her pharmacy waiting for prior authorization. She was scheduled for sooner appointment as she has been out of her medications for 4-5 days. We discussed her current treatment plan in detail today. She does report some uncontrolled pain with her current treatment plan. I have recommended that we begin transitioning to long-acting medication. She is in agreement with this plan. We will began with a mild taper of her oxycodone down to 5 per day. I have encouraged her to use half to 1 tablet on an as-needed basis only. We will discuss adding long-acting medication next visit if needed. She verbally agrees. She has been receiving Daypro recently from our hospice but currently has 4 refills left. She will discuss further refills with her PCP. She also says that she will discuss her Zofran with her PCP as well. I have offered to refill her medications but she says that she has no problem contact her PCP for further refills. She does also report some worsening \"nerve\" pain in her lower extremity and foot. She has been using gabapentin with some improvement. We did discuss increasing her gabapentin but she has been experiencing some fatigue with the medication is not interested in adjusting at this time. She is otherwise doing well with no other complaints. I will have her follow-up in 3 months or sooner if needed. We discussed that Dr. Geoffrey Larios is no longer working as a provider in this pain management practice. We discussed that this change may involve adjustments to currently prescribed pain medications.   This patient is reminded that they have the option to transfer to another pain management clinic if desired. This patient understands and verbally agrees. Medication management consists of Oxycodone 10/325 mg 6 times daily p.r.n. .  Medications are helping with pain control and quality of life. Her pain is 4-5/10 with medication and 7-8/10 without. Pt describes pain as aching. Relieved with rest, medication,  and avoiding painful activities. Current treatment is helping to improve general activity, mood, walking, sleep, enjoyment of life     Pt does report constipation but is well controlled with OTC medication. Pt instructed to Increase fluids, Add fruit juices, Add high fiber diet, add Metamucil and/or Citrucel if not getting enough fiber in diet, and add OTC stool softener for moderate constipation. May add Miralax on an as needed basis. She is otherwise doing well with no other complaints today. She denies any adverse events including nausea, vomiting, dizziness, increased constipation, hallucinations, or seizures. POC UDS today. Confirmation pending. Allergies   Allergen Reactions    Bactrim [Sulfamethoprim Ds] Unknown (comments)    Cefuroxime Unknown (comments)    Codeine Unknown (comments)    Compazine [Prochlorperazine Edisylate] Unknown (comments)    Glycopyrrolate Unknown (comments)    Keflex [Cephalexin] Unknown (comments)    Levaquin [Levofloxacin] Unknown (comments)    Morphine Unknown (comments)    Phenergan [Promethazine] Unknown (comments)    Reglan [Metoclopramide] Unknown (comments)    Vicodin [Hydrocodone-Acetaminophen] Unknown (comments)       Past Surgical History:   Procedure Laterality Date    HX ABDOMINAL LAPAROSCOPY      HX APPENDECTOMY      HX GYN       x2    HX HEENT      vocal cord surgery    HX HEENT      sinus surgery         Review of Systems   Constitutional: Positive for malaise/fatigue. Negative for chills.    HENT: Negative for congestion, hearing loss and sore throat. Eyes: Negative for blurred vision and double vision. Respiratory: Negative for cough and wheezing. Cardiovascular: Negative for leg swelling. Gastrointestinal: Positive for nausea and vomiting. Negative for constipation, diarrhea and heartburn. Musculoskeletal: Positive for joint pain, myalgias and neck pain. Negative for falls. Skin: Negative. Neurological: Positive for sensory change. Negative for dizziness, tremors and seizures. Endo/Heme/Allergies: Positive for environmental allergies. Does not bruise/bleed easily. Psychiatric/Behavioral: Negative for depression and suicidal ideas. The patient is nervous/anxious and has insomnia. All other systems reviewed and are negative. Physical Exam   Constitutional: She is oriented to person, place, and time. She appears well-developed and well-nourished. No distress. HENT:   Head: Normocephalic. Eyes: Conjunctivae and EOM are normal. Pupils are equal, round, and reactive to light. Neck: Normal range of motion. No thyromegaly present. Painful ROM   Pulmonary/Chest: Effort normal. No respiratory distress. Musculoskeletal: She exhibits tenderness. She exhibits no edema. Left wrist: She exhibits tenderness. Cervical back: She exhibits decreased range of motion, tenderness, pain and spasm. Lumbar back: She exhibits decreased range of motion, tenderness, pain and spasm. Neurological: She is alert and oriented to person, place, and time. No cranial nerve deficit. Gait abnormal.   Psychiatric: She has a normal mood and affect. Her behavior is normal. Judgment and thought content normal.   Nursing note and vitals reviewed. ASSESSMENT:    1. Encounter for long-term (current) use of high-risk medication    2. Chronic bilateral low back pain with bilateral sciatica    3. Pain in joint, multiple sites    4. Cervicalgia    5. Degeneration of cervical intervertebral disc    6. Other chronic pain    7. Lumbosacral radiculopathy    8. Chronic pain syndrome    9. DDD (degenerative disc disease), cervical    10. Cervical dystonia    11. Fibromyalgia           [place] Prescription Monitoring Program was reviewed which does not demonstrate aberrancies and/or inconsistencies with regard to the historical prescribing of controlled medications to this patient by other providers. PLAN / Pt Instructions:  1. Continue current plan with no evidence of addiction or diversion. Stable on current medication without adverse events. 2. Refill and adjust oxycodone 10/325 mg. Take 1/2 to 1 tablet up to 5 times daily as needed  3. Consider adding long acting medication next visit. 4. Continue gabapentin. Consider increasing later if needed. 5. Continue Zofran. 6. Continue Daypro. Please discuss further refills with your PCP or alternative treatment  7. Please follow-up with your PCP to get your blood pressure under better control. 8. Discussed post surgical plan in detail today. Please contact our office within 72 hours if you receive outside controlled substance for postsurgical pain. 9. For constipation:  Increase fluids, Add fruit juices, Add high fiber diet, add Metamucil and/or Citrucel if not getting enough fiber in diet, and add OTC stool softener for moderate constipation. May add Miralax on an as needed basis. 10. Discussed risks of addiction, dependency, and opioid induced hyperalgesia.    11. Return to clinic in 3 months      Medications Ordered Today   Medications    oxyCODONE-acetaminophen (PERCOCET 10)  mg per tablet     Sig: Take 1/2 to 1 tablet up to 5 times daily as needed for chronic, severe pain  Indications: Pain     Dispense:  150 Tab     Refill:  0    oxyCODONE-acetaminophen (PERCOCET 10)  mg per tablet     Sig: Take 1/2 to 1 tablet up to 5 times daily as needed for chronic, severe pain  Indications: Pain     Dispense:  150 Tab     Refill:  0    oxyCODONE-acetaminophen (PERCOCET 10)  mg per tablet     Sig: Take 1/2 to 1 tablet up to 5 times daily as needed for chronic, severe pain  Indications: Pain     Dispense:  150 Tab     Refill:  0       Pain medications prescribed with the objective of pain relief and improved physical and psychosocial function in this patient. Spent 25 minutes with patient today reviewing the treatment plan, goals of treatment plan, and limitations of the treatment plan, to include the potential for side effects from medications and procedures. Jorja Crigler, [place] 11/17/2017     Note: Please excuse any typographical errors. Voice recognition software was used for this note and may cause mistakes.

## 2017-11-17 NOTE — PROGRESS NOTES
Nursing Notes    Patient presents to the office today in follow-up. Patient rates her pain at 8/10 on the numerical pain scale. Comments:  Patient is here today for a follow up appt today she states her pain  Level today is an 8  She is a former [facility] patient and has been out of her meds since September but has problems with her insurance   Patient is having withdrawal from her medications     POC UDS was performed in office today per verbal order per [facility]    Any new labs or imaging since last appointment? NO    Have you been to an emergency room (ER) or urgent care clinic since your last visit? NO            Have you been hospitalized since your last visit? NO     If yes, where, when, and reason for visit? Have you seen or consulted any other health care providers outside of the [address] [place] [address]  since your last visit? NO     If yes, where, when, and reason for visit? HM deferred to pcp. Ms. Beverly Jensen has a reminder for a \"due or due soon\" health maintenance. I have asked that she contact her primary care provider for follow-up on this health maintenance.

## 2017-11-17 NOTE — MR AVS SNAPSHOT
Visit Information Date & Time Provider Department Dept. Phone Encounter #  
 11/17/2017 12:20 PM Dayna Pruett, [address] [facility] [phone] [id number] Follow-up Instructions Return in about 3 months (around 2/17/2018). Your Appointments 12/11/2017  8:20 AM  
Follow Up with THAO Marti [address] [facility] (VALENT[facility]N SCHEDULING) Appt Note: Return in about 3 months (around 12/11/2017). [address] [id number]  
[phone] [facility] [phone] Upcoming Health Maintenance Date Due Pneumococcal 19-64 Medium Risk (1 of 1 - PPSV23) 1/15/1993 DTaP/Tdap/Td series (1 - Tdap) 1/15/1995 PAP AKA CERVICAL CYTOLOGY 1/15/1995 Influenza Age 5 to Adult 8/1/2017 Allergies as of 11/17/2017  Review Complete On: 11/17/2017 By: THAO Marti Severity Noted Reaction Type Reactions Bactrim [Sulfamethoprim Ds]    Unknown (comments) Cefuroxime    Unknown (comments) Codeine    Unknown (comments) Compazine [Prochlorperazine Edisylate]    Unknown (comments) Glycopyrrolate    Unknown (comments) Keflex [Cephalexin]    Unknown (comments) Levaquin [Levofloxacin]    Unknown (comments) Morphine    Unknown (comments) Phenergan [Promethazine]    Unknown (comments) Reglan [Metoclopramide]    Unknown (comments) Vicodin [Hydrocodone-acetaminophen]    Unknown (comments) Current Immunizations  Never Reviewed No immunizations on file. Not reviewed this visit You Were Diagnosed With   
  
 Codes Comments Encounter for long-term (current) use of high-risk medication    -  Primary ICD-10-CM: N09.188 ICD-9-CM: V58.69 Chronic bilateral low back pain with bilateral sciatica     ICD-10-CM: M54.42, M54.41, G89.29 ICD-9-CM: 724.2, 724.3, 338.29 Pain in joint, multiple sites     ICD-10-CM: M25.50 ICD-9-CM: 719.49 Cervicalgia     ICD-10-CM: M54.2 ICD-9-CM: 723.1 Degeneration of cervical intervertebral disc     ICD-10-CM: M50.30 ICD-9-CM: 722.4 Other chronic pain     ICD-10-CM: G89.29 ICD-9-CM: 338.29 Lumbosacral radiculopathy     ICD-10-CM: M54.17 ICD-9-CM: 724.4 Chronic pain syndrome     ICD-10-CM: G89.4 ICD-9-CM: 338.4 DDD (degenerative disc disease), cervical     ICD-10-CM: M50.30 ICD-9-CM: 722.4 Cervical dystonia     ICD-10-CM: G24.3 ICD-9-CM: 333.83 Fibromyalgia     ICD-10-CM: M79.7 ICD-9-CM: 729.1 Vitals BP Pulse Temp Resp Weight(growth percentile) BMI  
 (!) 165/104 72 97.2 °F (36.2 °C) 18 179 lb (81.2 kg) 28.04 kg/m2 OB Status Smoking Status Having regular periods Current Every Day Smoker BMI and BSA Data Body Mass Index Body Surface Area 28.04 kg/m 2 1.96 m 2 Preferred Pharmacy Pharmacy Name Phone [address] [place] [address], [address] [place] [address] [phone] Your Updated Medication List  
  
   
This list is accurate as of: 11/17/17  2:09 PM.  Always use your most recent med list.  
  
  
  
  
 ATENOLOL PO Take  by mouth. frovatriptan 2.5 mg tablet Commonly known as:  Evins Glenda Take 1 Tab by mouth once as needed for Migraine for up to 1 dose. JANUVIA 100 mg tablet Generic drug:  SITagliptin Take 50 mg by mouth daily. METFORMIN PO Take  by mouth.  
  
 methylPREDNISolone 4 mg tablet Commonly known as:  Winnie Allis Per dose pack instructions MOTRIN 800 mg tablet Generic drug:  ibuprofen Take  by mouth.  
  
 naratriptan 2.5 mg Tab Commonly known as:  Teresa Sae Take 1 Tab by mouth once as needed for Migraine for up to 1 dose. 2.5 mg at onset of headache, may repeat in 4 hours if needed  
  
 ondansetron 8 mg disintegrating tablet Commonly known as:  ZOFRAN ODT  
 DISSOLVE 1 TABLET UNDER THE TONGUE EVERY EIGHT HOURS AS NEEDED FOR NAUSEA  
  
 * OTHER  
T-L-S-O  
  
 * OTHER  
DDS Lumbar Traction Belt  
  
 oxaprozin 600 mg tablet Commonly known as:  DAYPRO  
1-2 po prn severe headache  Max 2/day * PERCOCET  mg per tablet Generic drug:  oxyCODONE-acetaminophen Take 1 Tab by mouth. * oxyCODONE-acetaminophen  mg per tablet Commonly known as:  PERCOCET 10 Take 1/2 to 1 tablet up to 5 times daily as needed for chronic, severe pain  Indications: Pain * oxyCODONE-acetaminophen  mg per tablet Commonly known as:  PERCOCET 10 Take 1/2 to 1 tablet up to 5 times daily as needed for chronic, severe pain  Indications: Pain Start taking on:  12/16/2017 * oxyCODONE-acetaminophen  mg per tablet Commonly known as:  PERCOCET 10 Take 1/2 to 1 tablet up to 5 times daily as needed for chronic, severe pain  Indications: Pain Start taking on:  1/15/2018  
  
 propranolol LA 60 mg SR capsule Commonly known as:  INDERAL LA Take  by mouth daily. PROTONIX 40 mg tablet Generic drug:  pantoprazole Take 40 mg by mouth daily. REQUIP PO Take  by mouth. TENS Units Rebeca Bannister Commonly known as:  TENS 502  
1 Units by Does Not Apply route as directed. topiramate 25 mg tablet Commonly known as:  TOPAMAX This is a titration schedule. Start with 1 po qhs x 7 days. Then 1 po bid x 7 days. Then  1 po qam and 2 po qhs. Then  2 po bid. ZOFRAN (AS HYDROCHLORIDE) 8 mg tablet Generic drug:  ondansetron hcl Take 8 mg by mouth every eight (8) hours as needed for Nausea. ZyrTEC 10 mg tablet Generic drug:  cetirizine Take  by mouth daily. * Notice: This list has 6 medication(s) that are the same as other medications prescribed for you. Read the directions carefully, and ask your doctor or other care provider to review them with you. Prescriptions Printed Refills oxyCODONE-acetaminophen (PERCOCET 10)  mg per tablet 0 Sig: Take 1/2 to 1 tablet up to 5 times daily as needed for chronic, severe pain  Indications: Pain Class: Print  
 oxyCODONE-acetaminophen (PERCOCET 10)  mg per tablet 0 Starting on: 12/16/2017 Sig: Take 1/2 to 1 tablet up to 5 times daily as needed for chronic, severe pain  Indications: Pain Class: Print  
 oxyCODONE-acetaminophen (PERCOCET 10)  mg per tablet 0 Starting on: 1/15/2018 Sig: Take 1/2 to 1 tablet up to 5 times daily as needed for chronic, severe pain  Indications: Pain Class: Print We Performed the Following AMB POC DRUG SCREEN () [ [facility]] DRUG SCREEN [[id number] Custom] Follow-up Instructions Return in about 3 months (around 2/17/2018). Patient Instructions 1. Continue current plan with no evidence of addiction or diversion. Stable on current medication without adverse events. 2. Refill and adjust oxycodone 10/325 mg. Take 1/2 to 1 tablet up to 5 times daily as needed 3. Consider adding long acting medication next visit. 4. Continue gabapentin. Consider increasing later if needed. 5. Continue Zofran. 6. Continue Daypro. Please discuss further refills with your PCP or alternative treatment 7. For constipation:  Increase fluids, Add fruit juices, Add high fiber diet, add Metamucil and/or Citrucel if not getting enough fiber in diet, and add OTC stool softener for moderate constipation. May add Miralax on an as needed basis. 8. Discussed risks of addiction, dependency, and opioid induced hyperalgesia. 9. Return to clinic in 3 months Introducing [facility]! Nadja Kim introduces [employer] patient portal. Now you can access parts of your medical record, email your doctor's office, and request medication refills online. 1. In your internet browser, go to [email or web address] 2. Click on the First Time User? Click Here link in the Sign In box. You will see the New Member Sign Up page. 3. Enter your [employer] Access Code exactly as it appears below. You will not need to use this code after youve completed the sign-up process. If you do not sign up before the expiration date, you must request a new code. · [employer] Access Code: [email or web address] Expires: 12/10/2017 10:53 AM 
 
4. Enter the last four digits of your Social Security Number (xxxx) and Date of Birth (mm/dd/yyyy) as indicated and click Submit. You will be taken to the next sign-up page. 5. Create a [employer] ID. This will be your [employer] login ID and cannot be changed, so think of one that is secure and easy to remember. 6. Create a [employer] password. You can change your password at any time. 7. Enter your Password Reset Question and Answer. This can be used at a later time if you forget your password. 8. Enter your e-mail address. You will receive e-mail notification when new information is available in [address] 9. Click Sign Up. You can now view and download portions of your medical record. 10. Click the Download Summary menu link to download a portable copy of your medical information. If you have questions, please visit the Frequently Asked Questions section of the [employer] website. Remember, [employer] is NOT to be used for urgent needs. For medical emergencies, dial 911. Now available from your iPhone and Android! Please provide this summary of care documentation to your next provider. Your primary care clinician is listed as Emily Nath. If you have any questions after today's visit, please call [phone].

## 2017-12-18 ENCOUNTER — TELEPHONE (OUTPATIENT)
Dept: PAIN MANAGEMENT | Age: 42
End: 2017-12-18

## 2017-12-18 NOTE — TELEPHONE ENCOUNTER
SENT PA OXYCODONE APAP TO [facility].

## 2017-12-19 ENCOUNTER — TELEPHONE (OUTPATIENT)
Dept: PAIN MANAGEMENT | Age: 42
End: 2017-12-19

## 2018-01-24 NOTE — PROGRESS NOTES
HISTORY OF PRESENT ILLNESS  Josette Runner is a 36 y.o. female. HPI Comments: Meds help with pain control and quality of life. No new side effects reported today. Visit survey reviewed and will be scanned.  reviewed. Recent average level of pain(out of 10)-6  Chief complaint chronic pain  Back pain neck pain abdominal pain, pain all over  History fibromyalgia  Using Percocet 10 mg 6 times a day as needed  I am not familiar with the specifics of the patient's case. They were not able to fit her in with 1 of my associates, who she usually sees. I did review some of her records today. Medication helps improve general activity, mood, walking, sleep, enjoyment of life  At times she does also have headaches  She uses Klonopin prescribed by different clinic but reports it is to help with sleep and she does not use it every night  Motrin, Flexeril or also from other clinics  She is typically seen by our clinic about every 3 months              Review of Systems   Constitutional: Positive for malaise/fatigue. Negative for chills, fever and weight loss. HENT: Positive for congestion and sore throat. Negative for hearing loss. Eyes: Negative for blurred vision and double vision. Respiratory: Positive for cough and shortness of breath. Smoker (1.5 ppd)   Cardiovascular: Negative for chest pain and leg swelling. Gastrointestinal: Positive for heartburn. Negative for constipation, diarrhea, nausea and vomiting. Genitourinary: Positive for frequency. Musculoskeletal: Positive for back pain, joint pain, myalgias and neck pain. Negative for falls. Skin: Negative. Neurological: Positive for tingling, sensory change and headaches. Negative for dizziness, tremors, seizures and weakness. Endo/Heme/Allergies: Positive for environmental allergies. Does not bruise/bleed easily. Psychiatric/Behavioral: Negative for depression and suicidal ideas. The patient is nervous/anxious and has insomnia. Pt asking what to do about not hearing for the past 2 weeks, she is denying any pain, pt states sounds are muffled, having trouble hearing clearly, pt is not sure if she needs an appt with dr or needs to be referred to an ENT, pls advise, pt can be reached today at [phone]. All other systems reviewed and are negative. Physical Exam   Constitutional: She appears well-developed and well-nourished. She is cooperative. She does not have a sickly appearance. HENT:   Head: Normocephalic and atraumatic. Right Ear: External ear normal. No drainage. Left Ear: External ear normal. No drainage. Nose: Nose normal.   Eyes: Lids are normal. Right eye exhibits no discharge. Left eye exhibits no discharge. Right conjunctiva has no hemorrhage. Left conjunctiva has no hemorrhage. Neck: Neck supple. No tracheal deviation present. No thyroid mass present. Pulmonary/Chest: Effort normal. No respiratory distress. Neurological: She is alert. No cranial nerve deficit. Skin: Skin is intact. No rash noted. Psychiatric: Her speech is normal. Her affect is not angry. She does not express inappropriate judgment. Nursing note and vitals reviewed. ASSESSMENT and PLAN  Encounter Diagnoses   Name Primary?  Chronic pain syndrome Yes    Encounter for long-term (current) use of medications     Fibromyalgia     Musculoskeletal pain     Intractable migraine with aura without status migrainosus     Cervical osteophyte     Facet arthropathy, cervical (HCC)     Cervical dystonia     Lumbosacral radiculopathy     Degeneration of cervical intervertebral disc    --Urine test or oral swab today. Also, the prescription monitoring program was reviewed today. The majority of today's visit was spent counseling and coordinating care. Total visit time-40 minutes. -Dragon medical dictation software was used for portions of this report. Unintended errors may occur. No indicators for recent medication misuse.  reviewed. Pain Meds and Quality Of Life have been reviewed. Nonpharmacologic therapy and non-opioid pharmacologic therapy were considered. If opioid therapy is prescribed, this is only if the expected benefits are anticipated to outweigh risks.   Possible changes to treatment plan considered. Support/education given as needed. Today-medications are as listed. No significant changes to medications. Follow up -- 3 months.

## 2018-02-13 ENCOUNTER — OFFICE VISIT (OUTPATIENT)
Dept: PAIN MANAGEMENT | Age: 43
End: 2018-02-13

## 2018-02-13 VITALS
HEART RATE: 86 BPM | TEMPERATURE: 98 F | DIASTOLIC BLOOD PRESSURE: 89 MMHG | RESPIRATION RATE: 18 BRPM | WEIGHT: 179 LBS | SYSTOLIC BLOOD PRESSURE: 148 MMHG | BODY MASS INDEX: 28.04 KG/M2

## 2018-02-13 DIAGNOSIS — M54.41 CHRONIC BILATERAL LOW BACK PAIN WITH BILATERAL SCIATICA: ICD-10-CM

## 2018-02-13 DIAGNOSIS — G89.29 OTHER CHRONIC PAIN: ICD-10-CM

## 2018-02-13 DIAGNOSIS — M50.30 DDD (DEGENERATIVE DISC DISEASE), CERVICAL: ICD-10-CM

## 2018-02-13 DIAGNOSIS — M79.7 FIBROMYALGIA: ICD-10-CM

## 2018-02-13 DIAGNOSIS — G89.4 CHRONIC PAIN SYNDROME: ICD-10-CM

## 2018-02-13 DIAGNOSIS — G89.29 CHRONIC ABDOMINAL PAIN: Primary | ICD-10-CM

## 2018-02-13 DIAGNOSIS — M54.2 CERVICALGIA: ICD-10-CM

## 2018-02-13 DIAGNOSIS — R10.9 CHRONIC ABDOMINAL PAIN: Primary | ICD-10-CM

## 2018-02-13 DIAGNOSIS — M54.17 LUMBOSACRAL RADICULOPATHY: ICD-10-CM

## 2018-02-13 DIAGNOSIS — M47.812 FACET ARTHROPATHY, CERVICAL: ICD-10-CM

## 2018-02-13 DIAGNOSIS — G89.29 CHRONIC BILATERAL LOW BACK PAIN WITH BILATERAL SCIATICA: ICD-10-CM

## 2018-02-13 DIAGNOSIS — M54.42 CHRONIC BILATERAL LOW BACK PAIN WITH BILATERAL SCIATICA: ICD-10-CM

## 2018-02-13 DIAGNOSIS — M25.50 PAIN IN JOINT, MULTIPLE SITES: ICD-10-CM

## 2018-02-13 DIAGNOSIS — M50.30 DEGENERATION OF CERVICAL INTERVERTEBRAL DISC: ICD-10-CM

## 2018-02-13 DIAGNOSIS — M79.18 MUSCULOSKELETAL PAIN: ICD-10-CM

## 2018-02-13 RX ORDER — CYCLOBENZAPRINE HCL 10 MG
10 TABLET ORAL AS NEEDED
COMMUNITY
Start: 2017-12-29

## 2018-02-13 RX ORDER — OXYCODONE AND ACETAMINOPHEN 10; 325 MG/1; MG/1
TABLET ORAL
Qty: 150 TAB | Refills: 0 | Status: SHIPPED | OUTPATIENT
Start: 2018-02-13 | End: 2018-02-13 | Stop reason: CLARIF

## 2018-02-13 RX ORDER — LOSARTAN POTASSIUM 100 MG/1
100 TABLET ORAL DAILY
COMMUNITY
Start: 2018-01-16

## 2018-02-13 RX ORDER — OXYCODONE AND ACETAMINOPHEN 10; 325 MG/1; MG/1
TABLET ORAL
Qty: 150 TAB | Refills: 0 | Status: SHIPPED | OUTPATIENT
Start: 2018-04-15 | End: 2018-05-16 | Stop reason: SDUPTHER

## 2018-02-13 RX ORDER — OXYCODONE AND ACETAMINOPHEN 10; 325 MG/1; MG/1
TABLET ORAL
Qty: 150 TAB | Refills: 0 | Status: SHIPPED | OUTPATIENT
Start: 2018-03-16

## 2018-02-13 RX ORDER — GABAPENTIN 300 MG/1
300 CAPSULE ORAL EVERY 8 HOURS
Qty: 90 CAP | Refills: 5 | Status: SHIPPED | OUTPATIENT
Start: 2018-02-13 | End: 2018-03-15

## 2018-02-13 RX ORDER — IBUPROFEN 200 MG
1 TABLET ORAL DAILY
COMMUNITY
Start: 2018-01-16

## 2018-02-13 RX ORDER — OXYCODONE AND ACETAMINOPHEN 10; 325 MG/1; MG/1
TABLET ORAL
Qty: 150 TAB | Refills: 0 | Status: SHIPPED | OUTPATIENT
Start: 2018-02-15

## 2018-02-13 RX ORDER — GABAPENTIN 300 MG/1
300 CAPSULE ORAL 3 TIMES DAILY
COMMUNITY
Start: 2018-01-16

## 2018-02-13 RX ORDER — LORATADINE 10 MG/1
10 TABLET ORAL AS NEEDED
COMMUNITY
Start: 2017-12-16

## 2018-02-13 RX ORDER — ONDANSETRON 8 MG/1
TABLET, ORALLY DISINTEGRATING ORAL
Qty: 30 TAB | Refills: 5 | Status: SHIPPED | OUTPATIENT
Start: 2018-02-13

## 2018-02-13 NOTE — PATIENT INSTRUCTIONS
1. Continue current plan with no evidence of addiction or diversion. Stable on current medication without adverse events. 2. Refill  oxycodone 10/325 mg. Take 1/2 to 1 tablet up to 5 times daily as needed  3. Consider adding long acting medication next visit. 4. Refill gabapentin. Consider increasing later if needed. 5. Refill Zofran. 6. Continue to follow-up with your PCP to get your blood pressure under better control. 7. Discussed post surgical plan in detail today. Please contact our office within 72 hours if you receive outside controlled substance for postsurgical pain. 8. For constipation:  Increase fluids, Add fruit juices, Add high fiber diet, add Metamucil and/or Citrucel if not getting enough fiber in diet, and add OTC stool softener for moderate constipation. May add Miralax on an as needed basis. 9. Discussed risks of addiction, dependency, and opioid induced hyperalgesia.    10. Return to clinic in 3 months

## 2018-02-13 NOTE — MR AVS SNAPSHOT
[facility] [address] 
[phone] Patient: Penny Stinson MRN: [id number] NBT:[id number]/82/9820 Visit Information Date & Time Provider Department Dept. Phone Encounter #  
 2/13/2018  9:20 AM Angelina Luna [address] [facility] [phone] [id number] Follow-up Instructions Return in about 3 months (around 5/13/2018). Your Appointments 5/16/2018  9:20 AM  
Follow Up with THAO Gregory [address] [facility] (VALENTÍN SCHEDULING) Appt Note: Wednesday, May 16th 2018  @ 9:20am.  
 [address] [address]  
[phone] [facility] [phone] Upcoming Health Maintenance Date Due Pneumococcal 19-64 Medium Risk (1 of 1 - PPSV23) 1/15/1993 DTaP/Tdap/Td series (1 - Tdap) 1/15/1995 PAP AKA CERVICAL CYTOLOGY 1/15/1995 Influenza Age 5 to Adult 8/1/2017 Allergies as of 2/13/2018  Review Complete On: 2/13/2018 By: THAO Gregory Severity Noted Reaction Type Reactions Bactrim [Sulfamethoprim Ds]    Unknown (comments) Cefuroxime    Unknown (comments) Codeine    Unknown (comments) Compazine [Prochlorperazine Edisylate]    Unknown (comments) Glycopyrrolate    Unknown (comments) Keflex [Cephalexin]    Unknown (comments) Levaquin [Levofloxacin]    Unknown (comments) Morphine    Unknown (comments) Phenergan [Promethazine]    Unknown (comments) Reglan [Metoclopramide]    Unknown (comments) Vicodin [Hydrocodone-acetaminophen]    Unknown (comments) Current Immunizations  Never Reviewed No immunizations on file. Not reviewed this visit You Were Diagnosed With   
  
 Codes Comments Chronic abdominal pain    -  Primary ICD-10-CM: R10.9, G89.29 ICD-9-CM: 789.00, 338.29  Chronic bilateral low back pain with bilateral sciatica     ICD-10-CM: M54.42, M54.41, G89.29 ICD-9-CM: 724.2, 724.3, 338.29 Pain in joint, multiple sites     ICD-10-CM: M25.50 ICD-9-CM: 719.49 Cervicalgia     ICD-10-CM: M54.2 ICD-9-CM: 723.1 Degeneration of cervical intervertebral disc     ICD-10-CM: M50.30 ICD-9-CM: 722.4 Other chronic pain     ICD-10-CM: G89.29 ICD-9-CM: 338.29 Lumbosacral radiculopathy     ICD-10-CM: M54.17 ICD-9-CM: 724.4 Chronic pain syndrome     ICD-10-CM: G89.4 ICD-9-CM: 338.4 Musculoskeletal pain     ICD-10-CM: M79.1 ICD-9-CM: 729.1 DDD (degenerative disc disease), cervical     ICD-10-CM: M50.30 ICD-9-CM: 722.4 Facet arthropathy, cervical     ICD-10-CM: M12.88 ICD-9-CM: 721.0 Fibromyalgia     ICD-10-CM: M79.7 ICD-9-CM: 729.1 Vitals BP Pulse Temp Resp Weight(growth percentile) BMI  
 148/89 (BP 1 Location: Right arm, BP Patient Position: Sitting) 86 98 °F (36.7 °C) 18 179 lb (81.2 kg) 28.04 kg/m2 OB Status Smoking Status Having regular periods Current Every Day Smoker BMI and BSA Data Body Mass Index Body Surface Area 28.04 kg/m 2 1.96 m 2 Preferred Pharmacy Pharmacy Name Phone [address] [place] [address], [address] [place] [address] [phone] Your Updated Medication List  
  
   
This list is accurate as of: 2/13/18 10:09 AM.  Always use your most recent med list.  
  
  
  
  
 ATENOLOL PO Take  by mouth. cyclobenzaprine 10 mg tablet Commonly known as:  FLEXERIL Take 10 mg by mouth as needed. frovatriptan 2.5 mg tablet Commonly known as:  Cherelle Fill Take 1 Tab by mouth once as needed for Migraine for up to 1 dose. * gabapentin 300 mg capsule Commonly known as:  NEURONTIN Take 300 mg by mouth three (3) times daily. * gabapentin 300 mg capsule Commonly known as:  NEURONTIN Take 1 Cap by mouth every eight (8) hours for 30 days. JANUVIA 100 mg tablet Generic drug:  SITagliptin Take 50 mg by mouth daily. loratadine 10 mg tablet Commonly known as:  Lajune [place] Take 10 mg by mouth as needed. losartan 100 mg tablet Commonly known as:  COZAAR Take 100 mg by mouth daily. METFORMIN PO Take  by mouth.  
  
 methylPREDNISolone 4 mg tablet Commonly known as:  [place] Link Per dose pack instructions MOTRIN 800 mg tablet Generic drug:  ibuprofen Take  by mouth.  
  
 naratriptan 2.5 mg Tab Commonly known as:  [place] Knows Take 1 Tab by mouth once as needed for Migraine for up to 1 dose. 2.5 mg at onset of headache, may repeat in 4 hours if needed  
  
 nicotine 14 mg/24 hr patch Commonly known as:  NICODERM CQ  
1 Patch by TransDERmal route daily. ondansetron 8 mg disintegrating tablet Commonly known as:  ZOFRAN ODT  
DISSOLVE 1 TABLET UNDER THE TONGUE EVERY EIGHT HOURS AS NEEDED FOR NAUSEA  
  
 OTHER  
T-L-S-O  
  
 OTHER  
DDS Lumbar Traction Belt  
  
 oxaprozin 600 mg tablet Commonly known as:  DAYPRO  
1-2 po prn severe headache  Max 2/day * PERCOCET  mg per tablet Generic drug:  oxyCODONE-acetaminophen Take 1 Tab by mouth. * oxyCODONE-acetaminophen  mg per tablet Commonly known as:  PERCOCET 10 Take 1/2 to 1 tablet up to 5 times daily as needed for chronic, severe pain  Indications: Pain Start taking on:  2/15/2018 * oxyCODONE-acetaminophen  mg per tablet Commonly known as:  PERCOCET 10 Take 1/2 to 1 tablet up to 5 times daily as needed for chronic, severe pain  Indications: Pain Start taking on:  3/16/2018 * oxyCODONE-acetaminophen  mg per tablet Commonly known as:  PERCOCET 10 Take 1/2 to 1 tablet up to 5 times daily as needed for chronic, severe pain  Indications: Pain Start taking on:  4/15/2018  
  
 propranolol LA 60 mg SR capsule Commonly known as:  INDERAL LA Take  by mouth daily. PROTONIX 40 mg tablet Generic drug:  pantoprazole Take 40 mg by mouth daily. REQUIP PO Take  by mouth. TENS Units Tyrone Newtono Commonly known as:  TENS 502  
1 Units by Does Not Apply route as directed. topiramate 25 mg tablet Commonly known as:  TOPAMAX This is a titration schedule. Start with 1 po qhs x 7 days. Then 1 po bid x 7 days. Then  1 po qam and 2 po qhs. Then  2 po bid. ZOFRAN (AS HYDROCHLORIDE) 8 mg tablet Generic drug:  ondansetron hcl Take 8 mg by mouth every eight (8) hours as needed for Nausea. ZyrTEC 10 mg tablet Generic drug:  cetirizine Take  by mouth daily. * Notice: This list has 6 medication(s) that are the same as other medications prescribed for you. Read the directions carefully, and ask your doctor or other care provider to review them with you. Prescriptions Printed Refills  
 oxyCODONE-acetaminophen (PERCOCET 10)  mg per tablet 0 Starting on: 2/15/2018 Sig: Take 1/2 to 1 tablet up to 5 times daily as needed for chronic, severe pain  Indications: Pain Class: Print  
 oxyCODONE-acetaminophen (PERCOCET 10)  mg per tablet 0 Starting on: 3/16/2018 Sig: Take 1/2 to 1 tablet up to 5 times daily as needed for chronic, severe pain  Indications: Pain Class: Print  
 oxyCODONE-acetaminophen (PERCOCET 10)  mg per tablet 0 Starting on: 4/15/2018 Sig: Take 1/2 to 1 tablet up to 5 times daily as needed for chronic, severe pain  Indications: Pain Class: Print Prescriptions Sent to Pharmacy Refills  
 gabapentin (NEURONTIN) 300 mg capsule 5 Sig: Take 1 Cap by mouth every eight (8) hours for 30 days. Class: Normal  
 Pharmacy: [id number] [place] [address], [address] [facility] Ph #: [phone] Route: Oral  
 ondansetron (ZOFRAN ODT) 8 mg disintegrating tablet 5 Sig: DISSOLVE 1 TABLET UNDER THE TONGUE EVERY EIGHT HOURS AS NEEDED FOR NAUSEA  Class: Normal  
 Pharmacy: [id number] [facility], [address] [facility] #: [phone] Follow-up Instructions Return in about 3 months (around 5/13/2018). Patient Instructions 1. Continue current plan with no evidence of addiction or diversion. Stable on current medication without adverse events. 2. Refill  oxycodone 10/325 mg. Take 1/2 to 1 tablet up to 5 times daily as needed 3. Consider adding long acting medication next visit. 4. Continue gabapentin. Consider increasing later if needed. 5. Continue Zofran. 6. Continue to follow-up with your PCP to get your blood pressure under better control. 7. Discussed post surgical plan in detail today. Please contact our office within 72 hours if you receive outside controlled substance for postsurgical pain. 8. For constipation:  Increase fluids, Add fruit juices, Add high fiber diet, add Metamucil and/or Citrucel if not getting enough fiber in diet, and add OTC stool softener for moderate constipation. May add Miralax on an as needed basis. 9. Discussed risks of addiction, dependency, and opioid induced hyperalgesia. 10. Return to clinic in 3 months Introducing [facility]! [facility] introduces OpenAir patient portal. Now you can access parts of your medical record, email your doctor's office, and request medication refills online. 1. In your internet browser, go to [email or web address] 2. Click on the First Time User? Click Here link in the Sign In box. You will see the New Member Sign Up page. 3. Enter your OpenAir Access Code exactly as it appears below. You will not need to use this code after youve completed the sign-up process. If you do not sign up before the expiration date, you must request a new code. · OpenAir Access Code: [email or web address] Expires: 5/14/2018 10:09 AM 
 
4.  Enter the last four digits of your Social Security Number (xxxx) and Date of Birth (mm/dd/yyyy) as indicated and click Submit. You will be taken to the next sign-up page. 5. Create a Anygma ID. This will be your Anygma login ID and cannot be changed, so think of one that is secure and easy to remember. 6. Create a Anygma password. You can change your password at any time. 7. Enter your Password Reset Question and Answer. This can be used at a later time if you forget your password. 8. Enter your e-mail address. You will receive e-mail notification when new information is available in [address] 9. Click Sign Up. You can now view and download portions of your medical record. 10. Click the Download Summary menu link to download a portable copy of your medical information. If you have questions, please visit the Frequently Asked Questions section of the Anygma website. Remember, Anygma is NOT to be used for urgent needs. For medical emergencies, dial 911. Now available from your iPhone and Android! Please provide this summary of care documentation to your next provider. Your primary care clinician is listed as Lamonte Hylton. If you have any questions after today's visit, please call [phone].

## 2018-02-13 NOTE — PROGRESS NOTES
Nursing Notes    Patient presents to the office today in follow-up. Patient rates her pain at 6/10 on the numerical pain scale. Reviewed medications with counts as follows:    Rx Date filled Qty Dispensed Pill Count Last Dose Short   Percocet 10 mg 01/16/18 150 9 This am        Comments: Patient is here today for a follow up appt today she states her pain level today is a 6  She states she is in a lot of pain she states she has a migraine and has had it for 3 days . She states [facility] took her meds from her . She states she is trying to get into [facility] office but she has not received a phone jaime back . She states she needs a time released meds   She was told that because of her chronns it will not work for her. POC UDS was not performed in office today    Any new labs or imaging since last appointment? NO    Have you been to an emergency room (ER) or urgent care clinic since your last visit? NO            Have you been hospitalized since your last visit? NO     If yes, where, when, and reason for visit? Have you seen or consulted any other health care providers outside of the [address] [place] [address]  since your last visit? YES     If yes, where, when, and reason for visit? HM deferred to pcp.

## 2018-05-16 ENCOUNTER — OFFICE VISIT (OUTPATIENT)
Dept: PAIN MANAGEMENT | Age: 43
End: 2018-05-16

## 2018-05-16 VITALS
HEIGHT: 67 IN | DIASTOLIC BLOOD PRESSURE: 92 MMHG | WEIGHT: 179 LBS | BODY MASS INDEX: 28.09 KG/M2 | RESPIRATION RATE: 18 BRPM | TEMPERATURE: 98.1 F | HEART RATE: 64 BPM | SYSTOLIC BLOOD PRESSURE: 148 MMHG

## 2018-05-16 DIAGNOSIS — Z79.899 ENCOUNTER FOR LONG-TERM (CURRENT) USE OF HIGH-RISK MEDICATION: Primary | ICD-10-CM

## 2018-05-16 DIAGNOSIS — G89.29 CHRONIC BILATERAL LOW BACK PAIN WITH BILATERAL SCIATICA: ICD-10-CM

## 2018-05-16 DIAGNOSIS — M54.42 CHRONIC BILATERAL LOW BACK PAIN WITH BILATERAL SCIATICA: ICD-10-CM

## 2018-05-16 DIAGNOSIS — G89.29 OTHER CHRONIC PAIN: ICD-10-CM

## 2018-05-16 DIAGNOSIS — M54.41 CHRONIC BILATERAL LOW BACK PAIN WITH BILATERAL SCIATICA: ICD-10-CM

## 2018-05-16 LAB
ALCOHOL UR POC: NORMAL
AMPHETAMINES UR POC: NEGATIVE
BARBITURATES UR POC: NORMAL
BENZODIAZEPINES UR POC: NEGATIVE
BUPRENORPHINE UR POC: NEGATIVE
CANNABINOIDS UR POC: NEGATIVE
CARISOPRODOL UR POC: NORMAL
COCAINE UR POC: NEGATIVE
FENTANYL UR POC: NORMAL
MDMA/ECSTASY UR POC: NORMAL
METHADONE UR POC: NEGATIVE
METHAMPHETAMINE UR POC: NORMAL
METHYLPHENIDATE UR POC: NORMAL
OPIATES UR POC: NEGATIVE
OXYCODONE UR POC: NORMAL
PHENCYCLIDINE UR POC: NORMAL
PROPOXYPHENE UR POC: NORMAL
TRAMADOL UR POC: NORMAL
TRICYCLICS UR POC: NORMAL

## 2018-05-16 RX ORDER — OXYCODONE AND ACETAMINOPHEN 10; 325 MG/1; MG/1
.5-1 TABLET ORAL
Qty: 120 TAB | Refills: 0 | Status: SHIPPED | OUTPATIENT
Start: 2018-05-16 | End: 2018-06-14 | Stop reason: SDUPTHER

## 2018-05-16 NOTE — PATIENT INSTRUCTIONS
1. Continue current plan with no evidence of addiction or diversion. Stable on current medication without adverse events. 2. Refill and adjust oxycodone 10/325 mg down to 4 times daily on an as-needed basis only. 3. Continue gabapentin. Consider increasing later if needed. 4. Continue Zofran. 5. Continue to follow-up with your PCP to get your blood pressure under better control. 6. Discussed post surgical plan in detail today. Please contact our office within 72 hours if you receive outside controlled substance for postsurgical pain. 7. For constipation:  Increase fluids, Add fruit juices, Add high fiber diet, add Metamucil and/or Citrucel if not getting enough fiber in diet, and add OTC stool softener for moderate constipation. May add Miralax on an as needed basis. 8. Discussed risks of addiction, dependency, and opioid induced hyperalgesia. 9. Please remember to call at least 4-5 business days prior to your medication refill. 10. Return to clinic in 3 months. Please call and cancel your appointment and pain management agreement if you do decide to transfer your care.

## 2018-05-16 NOTE — PROGRESS NOTES
HISTORY OF PRESENT ILLNESS  Estephanie Wong is a 39 y.o. female.  [facility] for followup of her pain which is widespread and related to fibromyalgia. She also complains of severe abdominal pain associated with chronic Crohn's disease. She is currently under the care of Dr. Fernando Das due to recent diagnosis of brain aneurysm. Ms. Joaquin April continues unchanged since last visit. She continues to do well with her current treatment plan which has been offering significant pain control. She reports no new complaints today. We began tapering her oxycodone during her last visit to no more than 5 times daily. We had discussed long-acting medications but our practice is moving to a more conservative and non-opioid plan of care we will continue tapering her oxycodone down to no more than 4 times daily today. We did have a lengthy conversation today about the departure of her previous providers are no longer with our practice. I explained to her that moving forward we will be tapering her off her oxycodone. She is very upset about this today and expresses her concern. She has stated that she will most likely be transferring her care to another practice. I have asked her to schedule appointment 3 months and to call and cancel her appointment and pain management agreement if she does decide to transfer her care. She verbally agrees. Medication management consists of Oxycodone 10/325 mg 5 times daily p.r.n. .  Medications are helping with pain control and quality of life. Her pain is 4-5/10 with medication and 7-8/10 without. Pt describes pain as aching. Relieved with rest, medication,  and avoiding painful activities. Current treatment is helping to improve general activity, mood, walking, sleep, enjoyment of life     Pt does report constipation but is well controlled with OTC medication.  Pt instructed to Increase fluids, Add fruit juices, Add high fiber diet, add Metamucil and/or Citrucel if not getting enough fiber in diet, and add OTC stool softener for moderate constipation. May add Miralax on an as needed basis. She is otherwise doing well with no other complaints today. She denies any adverse events including nausea, vomiting, dizziness, increased constipation, hallucinations, or seizures. POC UDS today. Confirmation pending. Allergies   Allergen Reactions    Bactrim [Sulfamethoprim Ds] Unknown (comments)    Cefuroxime Unknown (comments)    Codeine Unknown (comments)    Compazine [Prochlorperazine Edisylate] Unknown (comments)    Glycopyrrolate Unknown (comments)    Keflex [Cephalexin] Unknown (comments)    Levaquin [Levofloxacin] Unknown (comments)    Morphine Unknown (comments)    Phenergan [Promethazine] Unknown (comments)    Reglan [Metoclopramide] Unknown (comments)    Vicodin [Hydrocodone-Acetaminophen] Unknown (comments)       Past Surgical History:   Procedure Laterality Date    HX ABDOMINAL LAPAROSCOPY      HX APPENDECTOMY      HX GYN       x2    HX HEENT      vocal cord surgery    HX HEENT      sinus surgery         Review of Systems   Constitutional: Negative for chills. HENT: Negative for congestion, hearing loss and sore throat. Eyes: Negative for blurred vision and double vision. Respiratory: Negative for cough and wheezing. Cardiovascular: Negative for leg swelling. Gastrointestinal: Negative for constipation, diarrhea and heartburn. Musculoskeletal: Positive for joint pain and myalgias. Negative for falls. Skin: Negative. Neurological: Positive for sensory change. Negative for tremors and seizures. Endo/Heme/Allergies: Positive for environmental allergies. Does not bruise/bleed easily. Psychiatric/Behavioral: Negative for depression and suicidal ideas. The patient is nervous/anxious and has insomnia. All other systems reviewed and are negative.       Physical Exam   Constitutional: She is oriented to person, place, and time. She appears well-developed and well-nourished. No distress. HENT:   Head: Normocephalic. Eyes: Conjunctivae and EOM are normal. Pupils are equal, round, and reactive to light. Neck: Normal range of motion. No thyromegaly present. Painful ROM   Pulmonary/Chest: Effort normal. No respiratory distress. Musculoskeletal: She exhibits tenderness. She exhibits no edema. Left wrist: She exhibits tenderness. Cervical back: She exhibits decreased range of motion, tenderness, pain and spasm. Lumbar back: She exhibits decreased range of motion, tenderness, pain and spasm. Neurological: She is alert and oriented to person, place, and time. No cranial nerve deficit. Gait abnormal.   Psychiatric: She has a normal mood and affect. Her behavior is normal. Judgment and thought content normal.   Nursing note and vitals reviewed. ASSESSMENT:    1. Encounter for long-term (current) use of high-risk medication    2. Chronic bilateral low back pain with bilateral sciatica    3. Other chronic pain      MME: 75  COMM: [address] [place] [address] Program was reviewed which does not demonstrate aberrancies and/or inconsistencies with regard to the historical prescribing of controlled medications to this patient by other providers. PLAN / Pt Instructions:  1. Continue current plan with no evidence of addiction or diversion. Stable on current medication without adverse events. 2. Refill and adjust oxycodone 10/325 mg down to 4 times daily on an as-needed basis only. 3. Continue gabapentin. Consider increasing later if needed. 4. Continue Zofran. 5. Continue to follow-up with your PCP to get your blood pressure under better control. 6. Discussed post surgical plan in detail today. Please contact our office within 72 hours if you receive outside controlled substance for postsurgical pain.   7. For constipation:  Increase fluids, Add fruit juices, Add high fiber diet, add Metamucil and/or Citrucel if not getting enough fiber in diet, and add OTC stool softener for moderate constipation. May add Miralax on an as needed basis. 8. Discussed risks of addiction, dependency, and opioid induced hyperalgesia. 9. Please remember to call at least 4-5 business days prior to your medication refill. 10. Return to clinic in 3 months. Please call and cancel your appointment and pain management agreement if you do decide to transfer your care. Medications Ordered Today   Medications    oxyCODONE-acetaminophen (PERCOCET 10)  mg per tablet     Sig: Take 0.5-1 Tabs by mouth four (4) times daily as needed for Pain for up to 30 days. Max Daily Amount: 4 Tabs. for chronic, severe pain  Indications: Pain     Dispense:  120 Tab     Refill:  0       DISPOSITION   Pain medications are prescribed with the objective of pain relief and improved physical and psychosocial function in this patient.  Patient has been counseled on proper use of prescribed medications.  Patient has been counseled about chronic medical conditions and their relationship to anxiety and depression and recommended mental health support as needed.  Reviewed with patient self-help tools, home exercise, and lifestyle changes to assist the patient in self-management of symptoms.  Reviewed with patient the treatment plan, goals of treatment plan, and limitations of treatment plan, to include the potential for side effects from medications and procedures. If side effects occur, it is the responsibility of the patient to inform the clinic so that a change in the treatment plan can be made in a safe manner. The patient is advised that stopping prescribed medication may cause an increase in symptoms and possible medication withdrawal symptoms. The patient is informed an emergency room evaluation may be necessary if this occurs.       Spent 25 minutes with patient today which more than 50% of that time was spent on counseling and coordination of care. Shahla Zhang, [address] 5/16/2018     Note: Please excuse any typographical errors. Voice recognition software was used for this note and may cause mistakes.

## 2018-05-16 NOTE — PROGRESS NOTES
Nursing Notes    Patient presents to the office today in follow-up. Patient rates her pain at 6/10 on the numerical pain scale. Reviewed medications with counts as follows:    Rx Date filled Qty Dispensed Pill Count Last Dose Short   Percocet 10 mg 04/15/18 150 0 Last pm no         Comments: Patient is here today for a follow up appt today she states her pain level today is a 6  She states she has a pain in her foot and it makes her foot yanelis drop when she walks. She would like an MRI done her hip       POC UDS was performed in office today per verbal order per [facility]    Any new labs or imaging since last appointment? YES labs done at [facility]     Have you been to an emergency room (ER) or urgent care clinic since your last visit? NO            Have you been hospitalized since your last visit? NO     If yes, where, when, and reason for visit? Have you seen or consulted any other health care providers outside of the [address] [place] [address]  since your last visit? pcm     If yes, where, when, and reason for visit? Ms. Stevie Vergara has a reminder for a \"due or due soon\" health maintenance. I have asked that she contact her primary care provider for follow-up on this health maintenance.

## 2018-06-14 DIAGNOSIS — M54.42 CHRONIC BILATERAL LOW BACK PAIN WITH BILATERAL SCIATICA: ICD-10-CM

## 2018-06-14 DIAGNOSIS — G89.29 OTHER CHRONIC PAIN: ICD-10-CM

## 2018-06-14 DIAGNOSIS — G89.29 CHRONIC BILATERAL LOW BACK PAIN WITH BILATERAL SCIATICA: ICD-10-CM

## 2018-06-14 DIAGNOSIS — M54.41 CHRONIC BILATERAL LOW BACK PAIN WITH BILATERAL SCIATICA: ICD-10-CM

## 2018-06-14 RX ORDER — OXYCODONE AND ACETAMINOPHEN 10; 325 MG/1; MG/1
.5-1 TABLET ORAL
Qty: 120 TAB | Refills: 0 | Status: SHIPPED | OUTPATIENT
Start: 2018-06-15 | End: 2018-07-16 | Stop reason: SDUPTHER

## 2018-06-14 NOTE — TELEPHONE ENCOUNTER
Travis Correa has called requesting a refill of their controlled medication, percocet 10/325mg, for the management of chronic generalized pain. Last office visit date: 05/16/18    Date last  was pulled and reviewed : 06/14/18    Was the patient compliant when the above report was pulled? yes    Analgesia: 70% pain relief noted     Aberrancies: none noted     ADL's: patient is able to complete adl care    Adverse Reaction: none noted     Provider's last note and plan of care reviewed? yes  Request forwarded to provider for review.

## 2018-06-15 ENCOUNTER — TELEPHONE (OUTPATIENT)
Dept: PAIN MANAGEMENT | Age: 43
End: 2018-06-15

## 2018-07-16 DIAGNOSIS — G89.29 OTHER CHRONIC PAIN: ICD-10-CM

## 2018-07-16 DIAGNOSIS — G89.29 CHRONIC BILATERAL LOW BACK PAIN WITH BILATERAL SCIATICA: ICD-10-CM

## 2018-07-16 DIAGNOSIS — M54.41 CHRONIC BILATERAL LOW BACK PAIN WITH BILATERAL SCIATICA: ICD-10-CM

## 2018-07-16 DIAGNOSIS — M54.42 CHRONIC BILATERAL LOW BACK PAIN WITH BILATERAL SCIATICA: ICD-10-CM

## 2018-07-16 RX ORDER — OXYCODONE AND ACETAMINOPHEN 10; 325 MG/1; MG/1
.5-1 TABLET ORAL
Qty: 120 TAB | Refills: 0 | Status: SHIPPED | OUTPATIENT
Start: 2018-07-16 | End: 2018-08-15 | Stop reason: SDUPTHER

## 2018-07-16 NOTE — TELEPHONE ENCOUNTER
Patient identity verified using 2 patient identifiers. Group education topic covered:  Nurse visit    Questions regarding group topic? None noted    Aberrances present on  report pulled? ( If so, please describe.)  No ;  appropriate    Patient contact number: [phone]    What percentage of relief does the pain medication(s) provide? (Between 0-100) 70% pain relief noted     Are you able to perform your daily tasks when taking your medication? (Such as: Personal Hygiene, Cooking, Cleaning) : Yes    Are you having any side effects from your medication? (Ex; nausea, vomiting,constipation) No  What relieves the side effects? Questions regarding one one one discussion? No     Patient did not bring empty medication bottle(s) percocet 10/325mg filled on 06/15/18  for this office visit; advised to bring in all medication bottles, empty or otherwise, for all office visits per [facility] policy. VS: 97.0-65-/100. Patient states she has not taken b/p med this a.m. ; provider made aware.

## 2018-07-17 ENCOUNTER — TELEPHONE (OUTPATIENT)
Dept: PAIN MANAGEMENT | Age: 43
End: 2018-07-17

## 2018-07-17 NOTE — TELEPHONE ENCOUNTER
Oxycodone/apap pa was approved by [employer] until 1/13/19. Faxed to pharmacy.  Called pt to let her know of approval.

## 2018-08-15 ENCOUNTER — OFFICE VISIT (OUTPATIENT)
Dept: PAIN MANAGEMENT | Age: 43
End: 2018-08-15

## 2018-08-15 VITALS
SYSTOLIC BLOOD PRESSURE: 166 MMHG | BODY MASS INDEX: 28.09 KG/M2 | HEIGHT: 67 IN | RESPIRATION RATE: 16 BRPM | DIASTOLIC BLOOD PRESSURE: 97 MMHG | TEMPERATURE: 97.1 F | WEIGHT: 179 LBS | HEART RATE: 64 BPM

## 2018-08-15 DIAGNOSIS — M50.30 DDD (DEGENERATIVE DISC DISEASE), CERVICAL: ICD-10-CM

## 2018-08-15 DIAGNOSIS — K66.0 ADHESION OF ABDOMINAL WALL: ICD-10-CM

## 2018-08-15 DIAGNOSIS — G24.3 CERVICAL DYSTONIA: ICD-10-CM

## 2018-08-15 DIAGNOSIS — M54.17 LUMBOSACRAL RADICULOPATHY: ICD-10-CM

## 2018-08-15 DIAGNOSIS — M54.42 CHRONIC BILATERAL LOW BACK PAIN WITH BILATERAL SCIATICA: ICD-10-CM

## 2018-08-15 DIAGNOSIS — K50.90 CROHN'S DISEASE WITHOUT COMPLICATION, UNSPECIFIED GASTROINTESTINAL TRACT LOCATION (HCC): ICD-10-CM

## 2018-08-15 DIAGNOSIS — M54.12 BRACHIAL NEURITIS OR RADICULITIS: ICD-10-CM

## 2018-08-15 DIAGNOSIS — R10.9 CHRONIC ABDOMINAL PAIN: Primary | ICD-10-CM

## 2018-08-15 DIAGNOSIS — G89.29 CHRONIC BILATERAL LOW BACK PAIN WITH BILATERAL SCIATICA: ICD-10-CM

## 2018-08-15 DIAGNOSIS — G89.4 CHRONIC PAIN SYNDROME: ICD-10-CM

## 2018-08-15 DIAGNOSIS — G89.29 CHRONIC ABDOMINAL PAIN: Primary | ICD-10-CM

## 2018-08-15 DIAGNOSIS — M54.41 CHRONIC BILATERAL LOW BACK PAIN WITH BILATERAL SCIATICA: ICD-10-CM

## 2018-08-15 DIAGNOSIS — M79.7 FIBROMYALGIA: ICD-10-CM

## 2018-08-15 DIAGNOSIS — M79.18 MUSCULOSKELETAL PAIN: ICD-10-CM

## 2018-08-15 RX ORDER — OXYCODONE AND ACETAMINOPHEN 10; 325 MG/1; MG/1
.5-1 TABLET ORAL
Qty: 110 TAB | Refills: 0 | Status: SHIPPED | OUTPATIENT
Start: 2018-08-16 | End: 2018-09-15

## 2018-08-15 RX ORDER — OXYCODONE AND ACETAMINOPHEN 10; 325 MG/1; MG/1
.5-1 TABLET ORAL
Qty: 100 TAB | Refills: 0 | Status: SHIPPED | OUTPATIENT
Start: 2018-09-14 | End: 2018-10-14

## 2018-08-15 RX ORDER — OXYCODONE AND ACETAMINOPHEN 10; 325 MG/1; MG/1
.5-1 TABLET ORAL
Qty: 90 TAB | Refills: 0 | Status: SHIPPED | OUTPATIENT
Start: 2018-10-13 | End: 2018-11-12

## 2018-08-15 NOTE — MR AVS SNAPSHOT
[address] [facility] [address] 
[phone] Patient: Elayne Post MRN: [id number] Visit Information Date & Time Provider Department Dept. Phone Encounter #  
 8/15/2018 11:40 AM Merton Kanner, [address] [place] [address] for Pain Management [id number] Follow-up Instructions Return in about 3 months (around 11/15/2018). Upcoming Health Maintenance Date Due Pneumococcal 19-64 Medium Risk (1 of 1 - PPSV23) 1/15/1993 DTaP/Tdap/Td series (1 - Tdap) 1/15/1995 PAP AKA CERVICAL CYTOLOGY 1/15/1995 Influenza Age 5 to Adult 8/1/2018 Allergies as of 8/15/2018  Review Complete On: 8/15/2018 By: Merton Kanner, PA Severity Noted Reaction Type Reactions Bactrim [Sulfamethoprim Ds]    Unknown (comments) Cefuroxime    Unknown (comments) Codeine    Unknown (comments) Compazine [Prochlorperazine Edisylate]    Unknown (comments) Glycopyrrolate    Unknown (comments) Keflex [Cephalexin]    Unknown (comments) Levaquin [Levofloxacin]    Unknown (comments) Morphine    Unknown (comments) Phenergan [Promethazine]    Unknown (comments) Reglan [Metoclopramide]    Unknown (comments) Vicodin [Hydrocodone-acetaminophen]    Unknown (comments) Current Immunizations  Never Reviewed No immunizations on file. Not reviewed this visit You Were Diagnosed With   
  
 Codes Comments Chronic abdominal pain    -  Primary ICD-10-CM: R10.9, G89.29 ICD-9-CM: 789.00, 338.29 Chronic bilateral low back pain with bilateral sciatica     ICD-10-CM: M54.42, M54.41, G89.29 ICD-9-CM: 724.2, 724.3, 338.29 Cervical dystonia     ICD-10-CM: G24.3 ICD-9-CM: 333.83 Brachial neuritis or radiculitis     ICD-10-CM: M54.12 
ICD-9-CM: 723.4 Lumbosacral radiculopathy     ICD-10-CM: M54.17 ICD-9-CM: 724.4 DDD (degenerative disc disease), cervical     ICD-10-CM: M50.30 ICD-9-CM: 722.4 Fibromyalgia     ICD-10-CM: M79.7 ICD-9-CM: 729.1 Musculoskeletal pain     ICD-10-CM: M79.1 ICD-9-CM: 729.1 Adhesion of abdominal wall     ICD-10-CM: K66.0 ICD-9-CM: 568.0 Chronic pain syndrome     ICD-10-CM: G89.4 ICD-9-CM: 338.4 Crohn's disease without complication, unspecified gastrointestinal tract location [facility])     ICD-10-CM: K50.90 ICD-9-CM: 642. 9 Vitals BP Pulse Temp Resp Height(growth percentile) Weight(growth percentile) (!) 166/97 64 97.1 °F (36.2 °C) 16 5' 7\" (1.702 m) 179 lb (81.2 kg) BMI OB Status Smoking Status 28.04 kg/m2 Having regular periods Current Every Day Smoker Vitals History BMI and BSA Data Body Mass Index Body Surface Area 28.04 kg/m 2 1.96 m 2 Preferred Pharmacy Pharmacy Name Phone [address] [place] [address], [address] [place] [address] [phone] Your Updated Medication List  
  
   
This list is accurate as of 8/15/18  1:13 PM.  Always use your most recent med list.  
  
  
  
  
 ATENOLOL PO Take  by mouth. cyclobenzaprine 10 mg tablet Commonly known as:  FLEXERIL Take 10 mg by mouth as needed. frovatriptan 2.5 mg tablet Commonly known as:  Felizardo Blackbird Take 1 Tab by mouth once as needed for Migraine for up to 1 dose. gabapentin 300 mg capsule Commonly known as:  NEURONTIN Take 300 mg by mouth three (3) times daily. JANUVIA 100 mg tablet Generic drug:  SITagliptin Take 50 mg by mouth daily. loratadine 10 mg tablet Commonly known as:  Janay Ivy Take 10 mg by mouth as needed. losartan 100 mg tablet Commonly known as:  COZAAR Take 100 mg by mouth daily. METFORMIN PO Take  by mouth.  
  
 methylPREDNISolone 4 mg tablet Commonly known as:  Alberto Jose Carlos Per dose pack instructions MOTRIN 800 mg tablet Generic drug:  ibuprofen Take  by mouth.  
  
 naratriptan 2.5 mg Tab Commonly known as:  Arsenio Board Take 1 Tab by mouth once as needed for Migraine for up to 1 dose. 2.5 mg at onset of headache, may repeat in 4 hours if needed  
  
 nicotine 14 mg/24 hr patch Commonly known as:  NICODERM CQ  
1 Patch by TransDERmal route daily. ondansetron 8 mg disintegrating tablet Commonly known as:  ZOFRAN ODT  
DISSOLVE 1 TABLET UNDER THE TONGUE EVERY EIGHT HOURS AS NEEDED FOR NAUSEA  
  
 OTHER  
T-L-S-O  
  
 OTHER  
DDS Lumbar Traction Belt  
  
 oxaprozin 600 mg tablet Commonly known as:  DAYPRO  
1-2 po prn severe headache  Max 2/day * PERCOCET  mg per tablet Generic drug:  oxyCODONE-acetaminophen Take 1 Tab by mouth. * oxyCODONE-acetaminophen  mg per tablet Commonly known as:  PERCOCET 10 Take 1/2 to 1 tablet up to 5 times daily as needed for chronic, severe pain  Indications: Pain * oxyCODONE-acetaminophen  mg per tablet Commonly known as:  PERCOCET 10 Take 1/2 to 1 tablet up to 5 times daily as needed for chronic, severe pain  Indications: Pain * oxyCODONE-acetaminophen  mg per tablet Commonly known as:  PERCOCET 10 Take 0.5-1 Tabs by mouth four (4) times daily as needed for Pain for up to 30 days. Max Daily Amount: 4 Tabs. No more than #110 per month for chronic, severe pain  Indications: Pain Start taking on:  8/16/2018 * oxyCODONE-acetaminophen  mg per tablet Commonly known as:  PERCOCET 10 Take 0.5-1 Tabs by mouth four (4) times daily as needed for Pain for up to 30 days. Max Daily Amount: 4 Tabs. No more than #100 per month for chronic, severe pain  Indications: Pain Start taking on:  9/14/2018 * oxyCODONE-acetaminophen  mg per tablet Commonly known as:  PERCOCET 10 Take 0.5-1 Tabs by mouth three (3) times daily as needed for Pain for up to 30 days. Max Daily Amount: 3 Tabs.  for chronic, severe pain Indications: Pain Start taking on:  10/13/2018  
  
 propranolol LA 60 mg SR capsule Commonly known as:  INDERAL LA Take  by mouth daily. PROTONIX 40 mg tablet Generic drug:  pantoprazole Take 40 mg by mouth daily. REQUIP PO Take  by mouth. TENS Units Salvador Crofts Commonly known as:  TENS 502  
1 Units by Does Not Apply route as directed. topiramate 25 mg tablet Commonly known as:  TOPAMAX This is a titration schedule. Start with 1 po qhs x 7 days. Then 1 po bid x 7 days. Then  1 po qam and 2 po qhs. Then  2 po bid. ZOFRAN 8 mg tablet Generic drug:  ondansetron hcl Take 8 mg by mouth every eight (8) hours as needed for Nausea. ZyrTEC 10 mg tablet Generic drug:  cetirizine Take  by mouth daily. * Notice: This list has 6 medication(s) that are the same as other medications prescribed for you. Read the directions carefully, and ask your doctor or other care provider to review them with you. Prescriptions Printed Refills  
 oxyCODONE-acetaminophen (PERCOCET 10)  mg per tablet 0 Starting on: 8/16/2018 Sig: Take 0.5-1 Tabs by mouth four (4) times daily as needed for Pain for up to 30 days. Max Daily Amount: 4 Tabs. No more than #110 per month for chronic, severe pain  Indications: Pain Class: Print Route: Oral  
 oxyCODONE-acetaminophen (PERCOCET 10)  mg per tablet 0 Starting on: 9/14/2018 Sig: Take 0.5-1 Tabs by mouth four (4) times daily as needed for Pain for up to 30 days. Max Daily Amount: 4 Tabs. No more than #100 per month for chronic, severe pain  Indications: Pain Class: Print Route: Oral  
 oxyCODONE-acetaminophen (PERCOCET 10)  mg per tablet 0 Starting on: 10/13/2018 Sig: Take 0.5-1 Tabs by mouth three (3) times daily as needed for Pain for up to 30 days. Max Daily Amount: 3 Tabs. for chronic, severe pain  Indications: Pain Class: Print  Route: Oral  
  
 Follow-up Instructions Return in about 3 months (around 11/15/2018). Patient Instructions 1. Modify current plan with no evidence of addiction or diversion. Stable on current medication without adverse events. 2. Refill and adjust oxycodone 10/325 mg down to 4 times daily on an as-needed basis only. 3. Continue gabapentin. Now receiving from PCP. 4. Continue Zofran. 5. Continue to follow-up with your PCP to get your blood pressure under better control. 6. Discussed post surgical plan in detail today. Please contact our office within 72 hours if you receive outside controlled substance for postsurgical pain. 7. For constipation:  Increase fluids, Add fruit juices, Add high fiber diet, add Metamucil and/or Citrucel if not getting enough fiber in diet, and add OTC stool softener for moderate constipation. May add Miralax on an as needed basis. 8. Discussed risks of addiction, dependency, and opioid induced hyperalgesia. 9. Please remember to call at least 5 business days prior to your medication refill. 10. Return to clinic in 3 months. Please call and cancel your appointment and pain management agreement if you do decide to transfer your care. Introducing [facility] & HE[facility]! [facility] introduces [employer] patient portal. Now you can access parts of your medical record, email your doctor's office, and request medication refills online. 1. In your internet browser, go to [email or web address] 2. Click on the First Time User? Click Here link in the Sign In box. You will see the New Member Sign Up page. 3. Enter your [employer] Access Code exactly as it appears below. You will not need to use this code after youve completed the sign-up process. If you do not sign up before the expiration date, you must request a new code. · [employer] Access Code: [email or web address] Expires: 11/13/2018  1:13 PM 
 
 4. Enter the last four digits of your Social Security Number (xxxx) and Date of Birth (mm/dd/yyyy) as indicated and click Submit. You will be taken to the next sign-up page. 5. Create a [employer] ID. This will be your [employer] login ID and cannot be changed, so think of one that is secure and easy to remember. 6. Create a [employer] password. You can change your password at any time. 7. Enter your Password Reset Question and Answer. This can be used at a later time if you forget your password. 8. Enter your e-mail address. You will receive e-mail notification when new information is available in [address] 9. Click Sign Up. You can now view and download portions of your medical record. 10. Click the Download Summary menu link to download a portable copy of your medical information. If you have questions, please visit the Frequently Asked Questions section of the [employer] website. Remember, [employer] is NOT to be used for urgent needs. For medical emergencies, dial 911. Now available from your iPhone and Android! Please provide this summary of care documentation to your next provider. Your primary care clinician is listed as Modesta Ambrosio. If you have any questions after today's visit, please call [phone].

## 2018-08-15 NOTE — PATIENT INSTRUCTIONS
1. Modify current plan with no evidence of addiction or diversion. Stable on current medication without adverse events. 2. Refill and adjust oxycodone 10/325 mg up to 4 times daily as needed, no more than #110 per month ×1 month, then adjust down to #100 per month ×1 month, then adjust down to no more than 3 times daily as needed until next visit per  3. Continue gabapentin. Now receiving from PCP. 4. Continue Zofran. 5. Continue to follow-up with your PCP to get your blood pressure under better control. 6. Discussed post surgical plan in detail today. Please contact our office within 72 hours if you receive outside controlled substance for postsurgical pain. 7. For constipation:  Increase fluids, Add fruit juices, Add high fiber diet, add Metamucil and/or Citrucel if not getting enough fiber in diet, and add OTC stool softener for moderate constipation. May add Miralax on an as needed basis. 8. Discussed risks of addiction, dependency, and opioid induced hyperalgesia. 9. Please remember to call at least 5 business days prior to your medication refill. 10. Return to clinic in 3 months. Please call and cancel your appointment and pain management agreement if you do decide to transfer your care.

## 2018-08-15 NOTE — PROGRESS NOTES
HISTORY OF PRESENT ILLNESS  Tara Alvarenga is a 39 y.o. female.  [facility] for followup of her pain which is widespread and related to fibromyalgia. She also complains of severe abdominal pain associated with chronic Crohn's disease. She is currently under the care of Dr. Farida Mckeon due to recent diagnosis of brain aneurysm. Ms. Leonora Santana continues unchanged since last visit. She continues to do well with her current treatment plan which has been offering significant pain control. She reports no new complaints today. We recently began tapering her oxycodone. She has been doing well with the taper plan so far. We will continue tapering as previously discussed. Please see tapering plan below. She does report that she is interested in continuing with her tapering plan but has expressed concerns about completely stopping her medications altogether. She does report good improvement with her treatment which has been allowing her to cope and function better. She has discussed transitioning her care with her PCP. I have asked her to call and cancel her appointment and pain management agreement she does decide to transfer her care. I will have her follow-up in 3 months for further evaluation and recommendation or sooner if needed. Medication management consists of Oxycodone 10/325 mg 4 times daily p.r.n. .  Medications are helping with pain control and quality of life. Her pain is 4-5/10 with medication and 7-8/10 without. Pt describes pain as aching. Relieved with rest, medication,  and avoiding painful activities. Current treatment is helping to improve general activity, mood, walking, sleep, enjoyment of life     Pt does report constipation but is well controlled with OTC medication. Pt instructed to Increase fluids, Add fruit juices, Add high fiber diet, add Metamucil and/or Citrucel if not getting enough fiber in diet, and add OTC stool softener for moderate constipation.  May add Miralax on an as needed basis. She is otherwise doing well with no other complaints today. She denies any adverse events including nausea, vomiting, dizziness, increased constipation, hallucinations, or seizures. MME: 75  COMM: 4  OSWESTRY: 38 %  Last UDS reviewed         Allergies   Allergen Reactions    Bactrim [Sulfamethoprim Ds] Unknown (comments)    Cefuroxime Unknown (comments)    Codeine Unknown (comments)    Compazine [Prochlorperazine Edisylate] Unknown (comments)    Glycopyrrolate Unknown (comments)    Keflex [Cephalexin] Unknown (comments)    Levaquin [Levofloxacin] Unknown (comments)    Morphine Unknown (comments)    Phenergan [Promethazine] Unknown (comments)    Reglan [Metoclopramide] Unknown (comments)    Vicodin [Hydrocodone-Acetaminophen] Unknown (comments)       Past Surgical History:   Procedure Laterality Date    HX ABDOMINAL LAPAROSCOPY      HX APPENDECTOMY      HX GYN       x2    HX HEENT      vocal cord surgery    HX HEENT      sinus surgery         Review of Systems   Constitutional: Negative for chills. HENT: Negative for congestion, hearing loss and sore throat. Eyes: Negative for blurred vision and double vision. Respiratory: Negative for cough and wheezing. Cardiovascular: Negative for leg swelling. Gastrointestinal: Negative for constipation, diarrhea and heartburn. Musculoskeletal: Positive for joint pain and myalgias. Negative for falls. Skin: Negative. Neurological: Positive for sensory change. Negative for tremors and seizures. Endo/Heme/Allergies: Positive for environmental allergies. Does not bruise/bleed easily. Psychiatric/Behavioral: Negative for depression and suicidal ideas. The patient is nervous/anxious and has insomnia. All other systems reviewed and are negative. Physical Exam   Constitutional: She is oriented to person, place, and time. She appears well-developed and well-nourished. No distress.    HENT: Head: Normocephalic. Eyes: Conjunctivae and EOM are normal. Pupils are equal, round, and reactive to light. Neck: Normal range of motion. No thyromegaly present. Painful ROM   Pulmonary/Chest: Effort normal. No respiratory distress. Musculoskeletal: She exhibits tenderness. She exhibits no edema. Left wrist: She exhibits tenderness. Cervical back: She exhibits decreased range of motion, tenderness, pain and spasm. Lumbar back: She exhibits decreased range of motion, tenderness, pain and spasm. Neurological: She is alert and oriented to person, place, and time. No cranial nerve deficit. Gait abnormal.   Psychiatric: She has a normal mood and affect. Her behavior is normal. Judgment and thought content normal.   Nursing note and vitals reviewed. ASSESSMENT:    1. Chronic abdominal pain    2. Chronic bilateral low back pain with bilateral sciatica    3. Cervical dystonia    4. Brachial neuritis or radiculitis    5. Lumbosacral radiculopathy    6. DDD (degenerative disc disease), cervical    7. Fibromyalgia    8. Musculoskeletal pain    9. Adhesion of abdominal wall    10. Chronic pain syndrome    11. Crohn's disease without complication, unspecified gastrointestinal tract location [facility])           [facility] Program was reviewed which does not demonstrate aberrancies and/or inconsistencies with regard to the historical prescribing of controlled medications to this patient by other providers. PLAN / Pt Instructions:  1. Modify current plan with no evidence of addiction or diversion. Stable on current medication without adverse events. 2. Refill and adjust oxycodone 10/325 mg up to 4 times daily as needed, no more than #110 per month ×1 month, then adjust down to #100 per month ×1 month, then adjust down to no more than 3 times daily as needed until next visit per  3. Continue gabapentin. Now receiving from PCP. 4. Continue Zofran.    5. Continue to follow-up with your PCP to get your blood pressure under better control. 6. Discussed post surgical plan in detail today. Please contact our office within 72 hours if you receive outside controlled substance for postsurgical pain. 7. For constipation:  Increase fluids, Add fruit juices, Add high fiber diet, add Metamucil and/or Citrucel if not getting enough fiber in diet, and add OTC stool softener for moderate constipation. May add Miralax on an as needed basis. 8. Discussed risks of addiction, dependency, and opioid induced hyperalgesia. 9. Please remember to call at least 5 business days prior to your medication refill. 10. Return to clinic in 3 months. Please call and cancel your appointment and pain management agreement if you do decide to transfer your care. Medications Ordered Today   Medications    oxyCODONE-acetaminophen (PERCOCET 10)  mg per tablet     Sig: Take 0.5-1 Tabs by mouth four (4) times daily as needed for Pain for up to 30 days. Max Daily Amount: 4 Tabs. No more than #110 per month for chronic, severe pain  Indications: Pain     Dispense:  110 Tab     Refill:  0    oxyCODONE-acetaminophen (PERCOCET 10)  mg per tablet     Sig: Take 0.5-1 Tabs by mouth four (4) times daily as needed for Pain for up to 30 days. Max Daily Amount: 4 Tabs. No more than #100 per month for chronic, severe pain  Indications: Pain     Dispense:  100 Tab     Refill:  0    oxyCODONE-acetaminophen (PERCOCET 10)  mg per tablet     Sig: Take 0.5-1 Tabs by mouth three (3) times daily as needed for Pain for up to 30 days. Max Daily Amount: 3 Tabs. for chronic, severe pain  Indications: Pain     Dispense:  90 Tab     Refill:  0       DISPOSITION   Pain medications are prescribed with the objective of pain relief and improved physical and psychosocial function in this patient.  Patient has been counseled on proper use of prescribed medications.    Patient has been counseled about chronic medical conditions and their relationship to anxiety and depression and recommended mental health support as needed.  Reviewed with patient self-help tools, home exercise, and lifestyle changes to assist the patient in self-management of symptoms.  Reviewed with patient the treatment plan, goals of treatment plan, and limitations of treatment plan, to include the potential for side effects from medications and procedures. If side effects occur, it is the responsibility of the patient to inform the clinic so that a change in the treatment plan can be made in a safe manner. The patient is advised that stopping prescribed medication may cause an increase in symptoms and possible medication withdrawal symptoms. The patient is informed an emergency room evaluation may be necessary if this occurs. Spent 25 minutes with patient today which more than 50% of that time was spent on counseling and coordination of care. Olga Jacques 8/15/2018     Note: Please excuse any typographical errors. Voice recognition software was used for this note and may cause mistakes.

## 2018-08-15 NOTE — PROGRESS NOTES
Nursing Notes    Patient presents to the office today in follow-up. Patient rates her pain at 6/10 on the numerical pain scale. Reviewed medications with counts as follows:    Rx Date filled Qty Dispensed Pill Count Last Dose Short   Percocet 10 mg` 07/17/18 120 7 This am no     :    Comments: Patient is here today for a follow up appt today she states her pain level today is a 6  PHQ 9 was done patient denies any depression at this time. Patient states that is out of her bp meds   She states she ran out yesterday     POC UDS was not performed in office today    Any new labs or imaging since last appointment? NO    Have you been to an emergency room (ER) or urgent care clinic since your last visit? NO            Have you been hospitalized since your last visit? NO     If yes, where, when, and reason for visit? Have you seen or consulted any other health care providers outside of the [address] [place] [address]  since your last visit? NO     If yes, where, when, and reason for visit? Ms. Gareth Mckeon has a reminder for a \"due or due soon\" health maintenance. I have asked that she contact her primary care provider for follow-up on this health maintenance.

## 2018-09-11 ENCOUNTER — TELEPHONE (OUTPATIENT)
Dept: PAIN MANAGEMENT | Age: 43
End: 2018-09-11

## 2018-09-11 NOTE — TELEPHONE ENCOUNTER
Medication refill called in 911 8:48am    1. Name, verified  2. Medicine needed - oxycodone percocet  3. [phone]  4. Analgesia - 50% pain relief  5. ADL - yes  6.   Adverse reaction to medicine - none

## 2018-09-12 NOTE — TELEPHONE ENCOUNTER
09/2018 prescriptions pre-printed by provider ; last uds in 05/2018 ; controlled substance agreement in 11/2017.

## 2018-09-12 NOTE — TELEPHONE ENCOUNTER
Fred Noel has called requesting a refill of their controlled medication, percocet 10/325mg , for the management of chronic generalized pain. Last office visit date: 08/15/18    Date last  was pulled and reviewed : 09/12/18    Was the patient compliant when the above report was pulled? yes    Analgesia: 50% pain relief noted     Aberrancies: none noted     ADL's: patient is able to complete adl care     Adverse Reaction: none noted     Provider's last note and plan of care reviewed? yes  Request forwarded to provider for review.

## 2018-10-09 ENCOUNTER — TELEPHONE (OUTPATIENT)
Dept: PAIN MANAGEMENT | Age: 43
End: 2018-10-09

## 2018-10-09 NOTE — TELEPHONE ENCOUNTER
Ericka Dunlap has called requesting a refill of their controlled medication, percocet, for the management of her chronic pain. Last office visit date: 08/15/18    Date last  was pulled and reviewed : 10/09/18, last fill date for percocet was 09/15/18    Was the patient compliant when the above report was pulled? yes    Analgesia: pt reports a 60-70% pain relief with her current opioid medication regimen     Aberrancies: none noted     ADL's: pt reports being able to perform her normal daily duties because she takes her medication     Adverse Reaction: none reported by the pt at this time. Provider's last note and plan of care reviewed? Yes, pre-printed prescription   Request forwarded to provider for review.

## 2018-10-11 NOTE — TELEPHONE ENCOUNTER
Please ensure patient has an updated opioid care agreement on file. Reviewed. OK to distribute prescription.

## 2018-10-12 NOTE — TELEPHONE ENCOUNTER
Pt was called and informed that her prescriptions were done and ready for . She was identified using 2 pt identifiers. She verbalized understanding and has no questions.

## 2023-04-29 NOTE — TELEPHONE ENCOUNTER
Patient called about medication refill (Percocet)    60-70%  Yes  No    Please give patient a call [phone] Received patient signout from Dr. Sharma.  Hx of renal stone c/o left flank pain.  UTI + given dose of abx.  Pending CT. CT positive for renal stone.  +UTI patient tolerating PO vitals stable.  Prescription sent to pharmacy and patient given return precautions advised to follow-up with urology